# Patient Record
Sex: MALE | Race: BLACK OR AFRICAN AMERICAN | NOT HISPANIC OR LATINO | Employment: UNEMPLOYED | RURAL
[De-identification: names, ages, dates, MRNs, and addresses within clinical notes are randomized per-mention and may not be internally consistent; named-entity substitution may affect disease eponyms.]

---

## 2023-01-31 ENCOUNTER — HOSPITAL ENCOUNTER (INPATIENT)
Facility: HOSPITAL | Age: 88
LOS: 7 days | Discharge: HOME HEALTH | DRG: 503 | End: 2023-02-07
Attending: INTERNAL MEDICINE | Admitting: INTERNAL MEDICINE
Payer: MEDICARE

## 2023-01-31 DIAGNOSIS — R64 CACHEXIA: ICD-10-CM

## 2023-01-31 DIAGNOSIS — R62.7 FAILURE TO THRIVE IN ADULT: ICD-10-CM

## 2023-01-31 DIAGNOSIS — R53.1 WEAKNESS: ICD-10-CM

## 2023-01-31 DIAGNOSIS — R07.9 CHEST PAIN: ICD-10-CM

## 2023-01-31 DIAGNOSIS — S91.309A OPEN WOUND OF FOOT: ICD-10-CM

## 2023-01-31 DIAGNOSIS — M86.9 OSTEOMYELITIS OF LEFT FOOT: ICD-10-CM

## 2023-01-31 DIAGNOSIS — E78.5 HYPERLIPIDEMIA, UNSPECIFIED HYPERLIPIDEMIA TYPE: ICD-10-CM

## 2023-01-31 DIAGNOSIS — G20.A1 DEMENTIA ASSOCIATED WITH PARKINSON'S DISEASE: ICD-10-CM

## 2023-01-31 DIAGNOSIS — M86.9 OSTEOMYELITIS OF LEFT FOOT, UNSPECIFIED TYPE: Primary | ICD-10-CM

## 2023-01-31 DIAGNOSIS — D63.8 ANEMIA OF CHRONIC DISEASE: ICD-10-CM

## 2023-01-31 DIAGNOSIS — F02.80 DEMENTIA ASSOCIATED WITH PARKINSON'S DISEASE: ICD-10-CM

## 2023-01-31 LAB
ALBUMIN SERPL BCP-MCNC: 2.8 G/DL (ref 3.5–5)
ALBUMIN/GLOB SERPL: 0.5 {RATIO}
ALP SERPL-CCNC: 91 U/L (ref 45–115)
ALT SERPL W P-5'-P-CCNC: 35 U/L (ref 16–61)
ANION GAP SERPL CALCULATED.3IONS-SCNC: 12 MMOL/L (ref 7–16)
APTT PPP: 26.8 SECONDS (ref 25.2–37.3)
AST SERPL W P-5'-P-CCNC: 38 U/L (ref 15–37)
BASOPHILS # BLD AUTO: 0.01 K/UL (ref 0–0.2)
BASOPHILS NFR BLD AUTO: 0.3 % (ref 0–1)
BILIRUB SERPL-MCNC: 0.5 MG/DL (ref ?–1.2)
BUN SERPL-MCNC: 33 MG/DL (ref 7–18)
BUN/CREAT SERPL: 37 (ref 6–20)
CALCIUM SERPL-MCNC: 9 MG/DL (ref 8.5–10.1)
CHLORIDE SERPL-SCNC: 116 MMOL/L (ref 98–107)
CO2 SERPL-SCNC: 30 MMOL/L (ref 21–32)
CREAT SERPL-MCNC: 0.89 MG/DL (ref 0.7–1.3)
DIFFERENTIAL METHOD BLD: ABNORMAL
EGFR (NO RACE VARIABLE) (RUSH/TITUS): 82 ML/MIN/1.73M²
EOSINOPHIL # BLD AUTO: 0.02 K/UL (ref 0–0.5)
EOSINOPHIL NFR BLD AUTO: 0.5 % (ref 1–4)
ERYTHROCYTE [DISTWIDTH] IN BLOOD BY AUTOMATED COUNT: 16.2 % (ref 11.5–14.5)
FLUAV AG UPPER RESP QL IA.RAPID: NEGATIVE
FLUBV AG UPPER RESP QL IA.RAPID: NEGATIVE
GLOBULIN SER-MCNC: 5.9 G/DL (ref 2–4)
GLUCOSE SERPL-MCNC: 88 MG/DL (ref 74–106)
HCT VFR BLD AUTO: 34.8 % (ref 40–54)
HGB BLD-MCNC: 10.2 G/DL (ref 13.5–18)
HOLD SPECIMEN: NORMAL
IMM GRANULOCYTES # BLD AUTO: 0.05 K/UL (ref 0–0.04)
IMM GRANULOCYTES NFR BLD: 1.3 % (ref 0–0.4)
INR BLD: 1.19
LYMPHOCYTES # BLD AUTO: 0.96 K/UL (ref 1–4.8)
LYMPHOCYTES NFR BLD AUTO: 24.2 % (ref 27–41)
MAGNESIUM SERPL-MCNC: 2.6 MG/DL (ref 1.7–2.3)
MCH RBC QN AUTO: 26.8 PG (ref 27–31)
MCHC RBC AUTO-ENTMCNC: 29.3 G/DL (ref 32–36)
MCV RBC AUTO: 91.6 FL (ref 80–96)
MONOCYTES # BLD AUTO: 0.35 K/UL (ref 0–0.8)
MONOCYTES NFR BLD AUTO: 8.8 % (ref 2–6)
MPC BLD CALC-MCNC: 10.4 FL (ref 9.4–12.4)
NEUTROPHILS # BLD AUTO: 2.57 K/UL (ref 1.8–7.7)
NEUTROPHILS NFR BLD AUTO: 64.9 % (ref 53–65)
NRBC # BLD AUTO: 0 X10E3/UL
NRBC, AUTO (.00): 0 %
NT-PROBNP SERPL-MCNC: 209 PG/ML (ref 1–450)
PLATELET # BLD AUTO: 189 K/UL (ref 150–400)
POTASSIUM SERPL-SCNC: 3.9 MMOL/L (ref 3.5–5.1)
PROT SERPL-MCNC: 8.7 G/DL (ref 6.4–8.2)
PROTHROMBIN TIME: 14.6 SECONDS (ref 11.7–14.7)
RBC # BLD AUTO: 3.8 M/UL (ref 4.6–6.2)
SARS-COV+SARS-COV-2 AG RESP QL IA.RAPID: NEGATIVE
SODIUM SERPL-SCNC: 154 MMOL/L (ref 136–145)
TROPONIN I SERPL HS-MCNC: 18.2 PG/ML
WBC # BLD AUTO: 3.96 K/UL (ref 4.5–11)

## 2023-01-31 PROCEDURE — 83880 ASSAY OF NATRIURETIC PEPTIDE: CPT | Performed by: NURSE PRACTITIONER

## 2023-01-31 PROCEDURE — 85025 COMPLETE CBC W/AUTO DIFF WBC: CPT | Performed by: NURSE PRACTITIONER

## 2023-01-31 PROCEDURE — 63600175 PHARM REV CODE 636 W HCPCS: Performed by: NURSE PRACTITIONER

## 2023-01-31 PROCEDURE — 80053 COMPREHEN METABOLIC PANEL: CPT | Performed by: NURSE PRACTITIONER

## 2023-01-31 PROCEDURE — 93010 EKG 12-LEAD: ICD-10-PCS | Mod: ,,, | Performed by: INTERNAL MEDICINE

## 2023-01-31 PROCEDURE — 93005 ELECTROCARDIOGRAM TRACING: CPT

## 2023-01-31 PROCEDURE — 99285 EMERGENCY DEPT VISIT HI MDM: CPT | Mod: 25

## 2023-01-31 PROCEDURE — 93010 ELECTROCARDIOGRAM REPORT: CPT | Mod: ,,, | Performed by: INTERNAL MEDICINE

## 2023-01-31 PROCEDURE — 85730 THROMBOPLASTIN TIME PARTIAL: CPT | Performed by: NURSE PRACTITIONER

## 2023-01-31 PROCEDURE — 99285 PR EMERGENCY DEPT VISIT,LEVEL V: ICD-10-PCS | Mod: ,,, | Performed by: NURSE PRACTITIONER

## 2023-01-31 PROCEDURE — 99285 EMERGENCY DEPT VISIT HI MDM: CPT | Mod: ,,, | Performed by: NURSE PRACTITIONER

## 2023-01-31 PROCEDURE — 25000003 PHARM REV CODE 250: Performed by: NURSE PRACTITIONER

## 2023-01-31 PROCEDURE — 85610 PROTHROMBIN TIME: CPT | Performed by: NURSE PRACTITIONER

## 2023-01-31 PROCEDURE — 84484 ASSAY OF TROPONIN QUANT: CPT | Performed by: NURSE PRACTITIONER

## 2023-01-31 PROCEDURE — 87070 CULTURE OTHR SPECIMN AEROBIC: CPT

## 2023-01-31 PROCEDURE — 83735 ASSAY OF MAGNESIUM: CPT | Performed by: NURSE PRACTITIONER

## 2023-01-31 PROCEDURE — 11000001 HC ACUTE MED/SURG PRIVATE ROOM

## 2023-01-31 PROCEDURE — 87428 SARSCOV & INF VIR A&B AG IA: CPT | Performed by: NURSE PRACTITIONER

## 2023-01-31 PROCEDURE — 87040 BLOOD CULTURE FOR BACTERIA: CPT

## 2023-01-31 RX ORDER — MEMANTINE HYDROCHLORIDE 10 MG/1
10 TABLET ORAL DAILY
Status: DISCONTINUED | OUTPATIENT
Start: 2023-02-01 | End: 2023-02-05

## 2023-01-31 RX ORDER — CARBIDOPA AND LEVODOPA 25; 100 MG/1; MG/1
1 TABLET ORAL DAILY
Status: DISCONTINUED | OUTPATIENT
Start: 2023-02-01 | End: 2023-02-05

## 2023-01-31 RX ORDER — DEXTROSE MONOHYDRATE AND SODIUM CHLORIDE 5; .45 G/100ML; G/100ML
INJECTION, SOLUTION INTRAVENOUS CONTINUOUS
Status: DISCONTINUED | OUTPATIENT
Start: 2023-02-01 | End: 2023-02-07 | Stop reason: HOSPADM

## 2023-01-31 RX ORDER — GLUCAGON 1 MG
1 KIT INJECTION
Status: DISCONTINUED | OUTPATIENT
Start: 2023-02-01 | End: 2023-02-07 | Stop reason: HOSPADM

## 2023-01-31 RX ORDER — ACETAMINOPHEN 325 MG/1
650 TABLET ORAL EVERY 8 HOURS PRN
Status: DISCONTINUED | OUTPATIENT
Start: 2023-02-01 | End: 2023-02-07 | Stop reason: HOSPADM

## 2023-01-31 RX ORDER — IBUPROFEN 200 MG
24 TABLET ORAL
Status: DISCONTINUED | OUTPATIENT
Start: 2023-02-01 | End: 2023-02-07 | Stop reason: HOSPADM

## 2023-01-31 RX ORDER — NALOXONE HCL 0.4 MG/ML
0.02 VIAL (ML) INJECTION
Status: DISCONTINUED | OUTPATIENT
Start: 2023-02-01 | End: 2023-02-07 | Stop reason: HOSPADM

## 2023-01-31 RX ORDER — ACETAMINOPHEN 325 MG/1
650 TABLET ORAL EVERY 4 HOURS PRN
Status: DISCONTINUED | OUTPATIENT
Start: 2023-02-01 | End: 2023-02-07 | Stop reason: HOSPADM

## 2023-01-31 RX ORDER — ATORVASTATIN CALCIUM 40 MG/1
40 TABLET, FILM COATED ORAL DAILY
Status: DISCONTINUED | OUTPATIENT
Start: 2023-02-01 | End: 2023-02-05

## 2023-01-31 RX ORDER — DONEPEZIL HYDROCHLORIDE 5 MG/1
10 TABLET, FILM COATED ORAL DAILY
Status: DISCONTINUED | OUTPATIENT
Start: 2023-02-01 | End: 2023-02-05

## 2023-01-31 RX ORDER — SODIUM CHLORIDE 0.9 % (FLUSH) 0.9 %
10 SYRINGE (ML) INJECTION EVERY 12 HOURS PRN
Status: DISCONTINUED | OUTPATIENT
Start: 2023-02-01 | End: 2023-02-07 | Stop reason: HOSPADM

## 2023-01-31 RX ORDER — ONDANSETRON 2 MG/ML
4 INJECTION INTRAMUSCULAR; INTRAVENOUS EVERY 8 HOURS PRN
Status: DISCONTINUED | OUTPATIENT
Start: 2023-02-01 | End: 2023-02-07 | Stop reason: HOSPADM

## 2023-01-31 RX ORDER — IBUPROFEN 200 MG
16 TABLET ORAL
Status: DISCONTINUED | OUTPATIENT
Start: 2023-02-01 | End: 2023-02-07 | Stop reason: HOSPADM

## 2023-01-31 RX ADMIN — SODIUM CHLORIDE 1000 ML: 9 INJECTION, SOLUTION INTRAVENOUS at 09:01

## 2023-01-31 RX ADMIN — PIPERACILLIN AND TAZOBACTAM 4.5 G: 4; .5 INJECTION, POWDER, FOR SOLUTION INTRAVENOUS; PARENTERAL at 10:01

## 2023-02-01 PROBLEM — D63.8 ANEMIA OF CHRONIC DISEASE: Status: ACTIVE | Noted: 2023-02-01

## 2023-02-01 PROBLEM — E78.5 HLD (HYPERLIPIDEMIA): Status: ACTIVE | Noted: 2023-02-01

## 2023-02-01 PROBLEM — R64 CACHEXIA: Status: ACTIVE | Noted: 2023-02-01

## 2023-02-01 PROBLEM — R62.7 FAILURE TO THRIVE IN ADULT: Status: ACTIVE | Noted: 2023-02-01

## 2023-02-01 PROBLEM — F02.80 DEMENTIA ASSOCIATED WITH PARKINSON'S DISEASE: Status: ACTIVE | Noted: 2023-02-01

## 2023-02-01 PROBLEM — R63.0 ANOREXIA: Status: ACTIVE | Noted: 2023-02-01

## 2023-02-01 PROBLEM — G20.A1 DEMENTIA ASSOCIATED WITH PARKINSON'S DISEASE: Status: ACTIVE | Noted: 2023-02-01

## 2023-02-01 LAB
ANION GAP SERPL CALCULATED.3IONS-SCNC: 10 MMOL/L (ref 7–16)
BACTERIA #/AREA URNS HPF: ABNORMAL /HPF
BASOPHILS # BLD AUTO: 0.01 K/UL (ref 0–0.2)
BASOPHILS NFR BLD AUTO: 0.3 % (ref 0–1)
BILIRUB UR QL STRIP: NEGATIVE
BUN SERPL-MCNC: 26 MG/DL (ref 7–18)
BUN/CREAT SERPL: 29 (ref 6–20)
CALCIUM SERPL-MCNC: 8.2 MG/DL (ref 8.5–10.1)
CHLORIDE SERPL-SCNC: 117 MMOL/L (ref 98–107)
CLARITY UR: CLEAR
CO2 SERPL-SCNC: 30 MMOL/L (ref 21–32)
COLOR UR: ABNORMAL
CREAT SERPL-MCNC: 0.89 MG/DL (ref 0.7–1.3)
CRP SERPL-MCNC: 0.8 MG/DL (ref 0–0.8)
DIFFERENTIAL METHOD BLD: ABNORMAL
EGFR (NO RACE VARIABLE) (RUSH/TITUS): 82 ML/MIN/1.73M²
EOSINOPHIL # BLD AUTO: 0.03 K/UL (ref 0–0.5)
EOSINOPHIL NFR BLD AUTO: 1 % (ref 1–4)
ERYTHROCYTE [DISTWIDTH] IN BLOOD BY AUTOMATED COUNT: 16.1 % (ref 11.5–14.5)
ERYTHROCYTE [SEDIMENTATION RATE] IN BLOOD BY WESTERGREN METHOD: 72 MM/HR (ref 0–30)
FERRITIN SERPL-MCNC: 173 NG/ML (ref 26–388)
FOLATE SERPL-MCNC: 19.3 NG/ML (ref 3.1–17.5)
GLUCOSE SERPL-MCNC: 90 MG/DL (ref 74–106)
GLUCOSE UR STRIP-MCNC: NORMAL MG/DL
HCT VFR BLD AUTO: 31.6 % (ref 40–54)
HGB BLD-MCNC: 9.5 G/DL (ref 13.5–18)
IMM GRANULOCYTES # BLD AUTO: 0.03 K/UL (ref 0–0.04)
IMM GRANULOCYTES NFR BLD: 1 % (ref 0–0.4)
IRON SATN MFR SERPL: 20 % (ref 14–50)
IRON SERPL-MCNC: 37 ΜG/DL (ref 65–175)
KETONES UR STRIP-SCNC: NEGATIVE MG/DL
LACTATE SERPL-SCNC: 0.8 MMOL/L (ref 0.4–2)
LEUKOCYTE ESTERASE UR QL STRIP: ABNORMAL
LYMPHOCYTES # BLD AUTO: 0.86 K/UL (ref 1–4.8)
LYMPHOCYTES NFR BLD AUTO: 28 % (ref 27–41)
MCH RBC QN AUTO: 27.4 PG (ref 27–31)
MCHC RBC AUTO-ENTMCNC: 30.1 G/DL (ref 32–36)
MCV RBC AUTO: 91.1 FL (ref 80–96)
MONOCYTES # BLD AUTO: 0.27 K/UL (ref 0–0.8)
MONOCYTES NFR BLD AUTO: 8.8 % (ref 2–6)
MPC BLD CALC-MCNC: 10.7 FL (ref 9.4–12.4)
MUCOUS THREADS #/AREA URNS HPF: ABNORMAL /HPF
NEUTROPHILS # BLD AUTO: 1.87 K/UL (ref 1.8–7.7)
NEUTROPHILS NFR BLD AUTO: 60.9 % (ref 53–65)
NITRITE UR QL STRIP: NEGATIVE
NRBC # BLD AUTO: 0 X10E3/UL
NRBC, AUTO (.00): 0 %
PH UR STRIP: 6.5 PH UNITS
PHOSPHATE SERPL-MCNC: 3.1 MG/DL (ref 2.5–4.5)
PLATELET # BLD AUTO: 159 K/UL (ref 150–400)
POTASSIUM SERPL-SCNC: 3.4 MMOL/L (ref 3.5–5.1)
PROT UR QL STRIP: NEGATIVE
RBC # BLD AUTO: 3.47 M/UL (ref 4.6–6.2)
RBC # UR STRIP: NEGATIVE /UL
RBC #/AREA URNS HPF: ABNORMAL /HPF
SODIUM SERPL-SCNC: 154 MMOL/L (ref 136–145)
SP GR UR STRIP: 1.03
SQUAMOUS #/AREA URNS LPF: ABNORMAL /LPF
TIBC SERPL-MCNC: 185 ΜG/DL (ref 250–450)
TRICHOMONAS #/AREA URNS HPF: ABNORMAL /HPF
UROBILINOGEN UR STRIP-ACNC: NORMAL MG/DL
VIT B12 SERPL-MCNC: 1906 PG/ML (ref 193–986)
WBC # BLD AUTO: 3.07 K/UL (ref 4.5–11)
WBC #/AREA URNS HPF: ABNORMAL /HPF
YEAST #/AREA URNS HPF: ABNORMAL /HPF

## 2023-02-01 PROCEDURE — 81001 URINALYSIS AUTO W/SCOPE: CPT | Performed by: NURSE PRACTITIONER

## 2023-02-01 PROCEDURE — 83550 IRON BINDING TEST: CPT

## 2023-02-01 PROCEDURE — 99223 1ST HOSP IP/OBS HIGH 75: CPT | Mod: AI,,, | Performed by: INTERNAL MEDICINE

## 2023-02-01 PROCEDURE — 83540 ASSAY OF IRON: CPT

## 2023-02-01 PROCEDURE — 82728 ASSAY OF FERRITIN: CPT

## 2023-02-01 PROCEDURE — 11000001 HC ACUTE MED/SURG PRIVATE ROOM

## 2023-02-01 PROCEDURE — 25000003 PHARM REV CODE 250

## 2023-02-01 PROCEDURE — 63600175 PHARM REV CODE 636 W HCPCS: Performed by: HOSPITALIST

## 2023-02-01 PROCEDURE — S5010 5% DEXTROSE AND 0.45% SALINE: HCPCS

## 2023-02-01 PROCEDURE — 83605 ASSAY OF LACTIC ACID: CPT

## 2023-02-01 PROCEDURE — 99222 PR INITIAL HOSPITAL CARE,LEVL II: ICD-10-PCS | Mod: GW,,, | Performed by: SURGERY

## 2023-02-01 PROCEDURE — 25000003 PHARM REV CODE 250: Performed by: HOSPITALIST

## 2023-02-01 PROCEDURE — 99222 1ST HOSP IP/OBS MODERATE 55: CPT | Mod: GW,,, | Performed by: SURGERY

## 2023-02-01 PROCEDURE — 86140 C-REACTIVE PROTEIN: CPT

## 2023-02-01 PROCEDURE — S5010 5% DEXTROSE AND 0.45% SALINE: HCPCS | Performed by: HOSPITALIST

## 2023-02-01 PROCEDURE — 85651 RBC SED RATE NONAUTOMATED: CPT

## 2023-02-01 PROCEDURE — 84100 ASSAY OF PHOSPHORUS: CPT

## 2023-02-01 PROCEDURE — 80048 BASIC METABOLIC PNL TOTAL CA: CPT

## 2023-02-01 PROCEDURE — 85025 COMPLETE CBC W/AUTO DIFF WBC: CPT

## 2023-02-01 PROCEDURE — 63600175 PHARM REV CODE 636 W HCPCS

## 2023-02-01 PROCEDURE — 99223 PR INITIAL HOSPITAL CARE,LEVL III: ICD-10-PCS | Mod: AI,,, | Performed by: INTERNAL MEDICINE

## 2023-02-01 PROCEDURE — 82746 ASSAY OF FOLIC ACID SERUM: CPT

## 2023-02-01 RX ORDER — POLYETHYLENE GLYCOL 3350 17 G/17G
17 POWDER, FOR SOLUTION ORAL DAILY
Status: DISCONTINUED | OUTPATIENT
Start: 2023-02-01 | End: 2023-02-05

## 2023-02-01 RX ADMIN — DEXTROSE AND SODIUM CHLORIDE: 5; 450 INJECTION, SOLUTION INTRAVENOUS at 11:02

## 2023-02-01 RX ADMIN — THIAMINE HYDROCHLORIDE 500 MG: 100 INJECTION, SOLUTION INTRAMUSCULAR; INTRAVENOUS at 01:02

## 2023-02-01 RX ADMIN — PIPERACILLIN AND TAZOBACTAM 4.5 G: 4; .5 INJECTION, POWDER, FOR SOLUTION INTRAVENOUS; PARENTERAL at 10:02

## 2023-02-01 RX ADMIN — PIPERACILLIN AND TAZOBACTAM 4.5 G: 4; .5 INJECTION, POWDER, FOR SOLUTION INTRAVENOUS; PARENTERAL at 06:02

## 2023-02-01 RX ADMIN — DEXTROSE AND SODIUM CHLORIDE: 5; 450 INJECTION, SOLUTION INTRAVENOUS at 12:02

## 2023-02-01 RX ADMIN — VANCOMYCIN HYDROCHLORIDE 1000 MG: 1 INJECTION, POWDER, LYOPHILIZED, FOR SOLUTION INTRAVENOUS at 02:02

## 2023-02-01 RX ADMIN — PIPERACILLIN AND TAZOBACTAM 4.5 G: 4; .5 INJECTION, POWDER, FOR SOLUTION INTRAVENOUS; PARENTERAL at 02:02

## 2023-02-01 NOTE — HPI
89-year-old male patient admitted to the hospital last night due to failure to thrive.  He has had a progressive decline in his mental status and only eat small amount of pureed food every of the day, according to his daughter.  The primary service has talked to the family extensively about hospice and comfort care, but they are not ready to commit him to this.  A feeding tube is requested.    In addition, the patient was noted to have some foot wounds and a left foot x-ray was performed revealing erosion at the base of the 5th metatarsal head, laterally.  Surgery was asked to evaluate this, as well.

## 2023-02-01 NOTE — ED TRIAGE NOTES
Presents to ED for complaints of not eating or drinking for 2 days, wound to left heel, sore throat and constipation.

## 2023-02-01 NOTE — CONSULTS
[facility]  General Surgery  Consult Note    Patient Name: Juliet Arboleda  MRN: [id number]  Code Status: Full Code  Admission Date: 1/31/2023  Hospital Length of Stay: 1 days  Attending Physician: Jerrod Almanza MD  Primary Care Provider: Primary Doctor No    Patient information was obtained from relative(s), ER records and primary team.     Consults  Subjective:     Principal Problem: Osteomyelitis of left foot    History of Present Illness: 89-year-old male patient admitted to the hospital last night due to failure to thrive.  He has had a progressive decline in his mental status and only eat small amount of pureed food every of the day, according to his daughter.  The primary service has talked to the family extensively about hospice and comfort care, but they are not ready to commit him to this.  A feeding tube is requested.    In addition, the patient was noted to have some foot wounds and a left foot x-ray was performed revealing erosion at the base of the 5th metatarsal head, laterally.  Surgery was asked to evaluate this, as well.      No current facility-administered medications on file prior to encounter.     No current outpatient medications on file prior to encounter.       Review of patient's allergies indicates:   Allergen Reactions    Bactrim [sulfamethoxazole-trimethoprim]        Past Medical History:   Diagnosis Date    Parkinson's disease      Past Surgical History:   Procedure Laterality Date    lung nodule removal N/A 1990     Family History       Problem Relation (Age of Onset)    No Known Problems Mother, Father          Tobacco Use    Smoking status: Former     Types: Cigarettes    Smokeless tobacco: Never   Substance and Sexual Activity    Alcohol use: Not Currently     Comment: Socially    Drug use: Not on file    Sexual activity: Not Currently     Review of Systems   Reason unable to perform ROS: Noncommunicative.   Constitutional:  Positive for appetite change.    Objective:     Vital Signs (Most Recent):  Temp: 97.9 °F (36.6 °C) (02/01/23 1200)  Pulse: (!) 56 (02/01/23 1200)  Resp: 18 (02/01/23 1200)  BP: (!) 110/37 (02/01/23 1200)  SpO2: 100 % (02/01/23 1200)   Vital Signs (24h Range):  Temp:  [97.5 °F (36.4 °C)-97.9 °F (36.6 °C)] 97.9 °F (36.6 °C)  Pulse:  [54-68] 56  Resp:  [18-20] 18  SpO2:  [100 %] 100 %  BP: ()/(37-96) 110/37     Weight: 52.2 kg (115 lb)  Body mass index is 17.49 kg/m².    Physical Exam  Constitutional:       Comments: Poorly responsive   Cardiovascular:      Rate and Rhythm: Normal rate.   Pulmonary:      Breath sounds: Normal breath sounds.   Abdominal:      Palpations: Abdomen is soft.   Musculoskeletal:         General: No swelling.   Skin:     Coloration: Skin is not jaundiced.      Comments: Lateral left foot at the midfoot level with ulceration that does not appear to have exposed bone.   Neurological:      General: No focal deficit present.   Psychiatric:         Mood and Affect: Mood normal.       Significant Labs:  I have reviewed all pertinent lab results within the past 24 hours.  CBC:   Recent Labs   Lab 02/01/23  0510   WBC 3.07*   RBC 3.47*   HGB 9.5*   HCT 31.6*      MCV 91.1   MCH 27.4   MCHC 30.1*     BMP:   Recent Labs   Lab 01/31/23  1857 02/01/23  0510   GLU 88 90   * 154*   K 3.9 3.4*   * 117*   CO2 30 30   BUN 33* 26*   CREATININE 0.89 0.89   CALCIUM 9.0 8.2*   MG 2.6*  --        Significant Diagnostics:  I have reviewed all pertinent imaging results/findings within the past 24 hours.      Assessment/Plan:     * Osteomyelitis of left foot  Evaluate in the OR to see if bone is exposed, with debridement if indicated    Cachexia  Related to poor appetite/pocketing/swallowing dysfunction secondary to mental status decline    After discussion with Dr. Almanza who attempted to obtain hospice and comfort measure status from a family, and after discussion with the family, it is decided to proceed with placement  of percutaneous gastrostomy tube, with opened tube if indicated.  Patient's daughter expresses understanding to risks and benefits discussed including infection, bleeding, malpositioning, and potential problems with anesthesia with a heart or lungs.  She expresses understanding wishes to proceed.      VTE Risk Mitigation (From admission, onward)         Ordered     IP VTE HIGH RISK PATIENT  Once         01/31/23 2319     Place sequential compression device  Until discontinued         01/31/23 2319     Reason for No Pharmacological VTE Prophylaxis  Once        Question:  Reasons:  Answer:  Physician Provided (leave comment)  Comment:  possible surgery tomorrow    01/31/23 2319                Thank you for your consult. I will follow-up with patient. Please contact us if you have any additional questions.    David Lutz MD  General Surgery  [facility]

## 2023-02-01 NOTE — SUBJECTIVE & OBJECTIVE
No current facility-administered medications on file prior to encounter.     No current outpatient medications on file prior to encounter.       Review of patient's allergies indicates:   Allergen Reactions    Bactrim [sulfamethoxazole-trimethoprim]        Past Medical History:   Diagnosis Date    Parkinson's disease      Past Surgical History:   Procedure Laterality Date    lung nodule removal N/A 1990     Family History       Problem Relation (Age of Onset)    No Known Problems Mother, Father          Tobacco Use    Smoking status: Former     Types: Cigarettes    Smokeless tobacco: Never   Substance and Sexual Activity    Alcohol use: Not Currently     Comment: Socially    Drug use: Not on file    Sexual activity: Not Currently     Review of Systems   Reason unable to perform ROS: Noncommunicative.   Constitutional:  Positive for appetite change.   Objective:     Vital Signs (Most Recent):  Temp: 97.9 °F (36.6 °C) (02/01/23 1200)  Pulse: (!) 56 (02/01/23 1200)  Resp: 18 (02/01/23 1200)  BP: (!) 110/37 (02/01/23 1200)  SpO2: 100 % (02/01/23 1200)   Vital Signs (24h Range):  Temp:  [97.5 °F (36.4 °C)-97.9 °F (36.6 °C)] 97.9 °F (36.6 °C)  Pulse:  [54-68] 56  Resp:  [18-20] 18  SpO2:  [100 %] 100 %  BP: ()/(37-96) 110/37     Weight: 52.2 kg (115 lb)  Body mass index is 17.49 kg/m².    Physical Exam  Constitutional:       Comments: Poorly responsive   Cardiovascular:      Rate and Rhythm: Normal rate.   Pulmonary:      Breath sounds: Normal breath sounds.   Abdominal:      Palpations: Abdomen is soft.   Musculoskeletal:         General: No swelling.   Skin:     Coloration: Skin is not jaundiced.      Comments: Lateral left foot at the midfoot level with ulceration that does not appear to have exposed bone.   Neurological:      General: No focal deficit present.   Psychiatric:         Mood and Affect: Mood normal.       Significant Labs:  I have reviewed all pertinent lab results within the past 24 hours.  CBC:    Recent Labs   Lab 02/01/23  0510   WBC 3.07*   RBC 3.47*   HGB 9.5*   HCT 31.6*      MCV 91.1   MCH 27.4   MCHC 30.1*     BMP:   Recent Labs   Lab 01/31/23  1857 02/01/23  0510   GLU 88 90   * 154*   K 3.9 3.4*   * 117*   CO2 30 30   BUN 33* 26*   CREATININE 0.89 0.89   CALCIUM 9.0 8.2*   MG 2.6*  --        Significant Diagnostics:  I have reviewed all pertinent imaging results/findings within the past 24 hours.

## 2023-02-01 NOTE — PLAN OF CARE
Problem: Adult Inpatient Plan of Care  Goal: Plan of Care Review  Outcome: Ongoing, Progressing  Flowsheets (Taken 2/1/2023 0113)  Plan of Care Reviewed With: patient  Goal: Patient-Specific Goal (Individualized)  Outcome: Ongoing, Progressing  Flowsheets (Taken 2/1/2023 0113)  Anxieties, Fears or Concerns: non voiced  Individualized Care Needs:   wound care   infection control  Goal: Absence of Hospital-Acquired Illness or Injury  Outcome: Ongoing, Progressing  Intervention: Identify and Manage Fall Risk  Flowsheets (Taken 2/1/2023 0113)  Safety Promotion/Fall Prevention:   assistive device/personal item within reach   family to remain at bedside   medications reviewed   side rails raised x 3   instructed to call staff for mobility  Intervention: Prevent Skin Injury  Flowsheets (Taken 2/1/2023 0113)  Body Position:   turned   right   weight shifting  Skin Protection:   adhesive use limited   incontinence pads utilized   silicone foam dressing in place  Intervention: Prevent and Manage VTE (Venous Thromboembolism) Risk  Flowsheets (Taken 2/1/2023 0113)  Activity Management: Patient unable to perform activities  VTE Prevention/Management:   fluids promoted   intravenous hydration   ROM (active) performed  Range of Motion: ROM (range of motion) performed  Intervention: Prevent Infection  Flowsheets (Taken 2/1/2023 0113)  Infection Prevention:   equipment surfaces disinfected   hand hygiene promoted   rest/sleep promoted   single patient room provided  Goal: Optimal Comfort and Wellbeing  Outcome: Ongoing, Progressing  Intervention: Monitor Pain and Promote Comfort  Flowsheets (Taken 2/1/2023 0113)  Pain Management Interventions:   care clustered   diversional activity provided   quiet environment facilitated   relaxation techniques promoted   position adjusted   pillow support provided  Intervention: Provide Person-Centered Care  Flowsheets (Taken 2/1/2023 0113)  Trust Relationship/Rapport:   choices provided   care  explained   questions answered   questions encouraged  Goal: Readiness for Transition of Care  Outcome: Ongoing, Progressing  Intervention: Mutually Develop Transition Plan  Flowsheets (Taken 2/1/2023 0113)  Transportation Anticipated: family or friend will provide  Communicated ROB with patient/caregiver: Date not available/Unable to determine  Do you expect to return to your current living situation?: Yes  Do you have help at home or someone to help you manage your care at home?: Yes     Problem: Impaired Wound Healing  Goal: Optimal Wound Healing  Outcome: Ongoing, Progressing  Intervention: Promote Wound Healing  Flowsheets (Taken 2/1/2023 0113)  Oral Nutrition Promotion: rest periods promoted  Sleep/Rest Enhancement:   regular sleep/rest pattern promoted   relaxation techniques promoted  Activity Management: Patient unable to perform activities  Pain Management Interventions:   care clustered   diversional activity provided   quiet environment facilitated   relaxation techniques promoted   position adjusted   pillow support provided     Problem: Skin Injury Risk Increased  Goal: Skin Health and Integrity  Outcome: Ongoing, Progressing  Intervention: Optimize Skin Protection  Flowsheets (Taken 2/1/2023 0113)  Pressure Reduction Techniques: weight shift assistance provided  Pressure Reduction Devices:   foam padding utilized   positioning supports utilized   specialty bed utilized  Skin Protection:   adhesive use limited   incontinence pads utilized   silicone foam dressing in place  Head of Bed (HOB) Positioning:   HOB at 20-30 degrees   HOB at 30-45 degrees  Intervention: Promote and Optimize Oral Intake  Flowsheets (Taken 2/1/2023 0113)  Oral Nutrition Promotion: rest periods promoted     Problem: Fall Injury Risk  Goal: Absence of Fall and Fall-Related Injury  Outcome: Ongoing, Progressing  Intervention: Identify and Manage Contributors  Flowsheets (Taken 2/1/2023 0113)  Medication Review/Management:  medications reviewed  Intervention: Promote Injury-Free Environment  Flowsheets (Taken 2/1/2023 0113)  Safety Promotion/Fall Prevention:   assistive device/personal item within reach   family to remain at bedside   medications reviewed   side rails raised x 3   instructed to call staff for mobility

## 2023-02-01 NOTE — ASSESSMENT & PLAN NOTE
Xray: Erosion at the base of the 5th metatarsal may reflect osteomyelitis.  However, no comparisons available to determine the chronicity of this finding.  Wound culture of the latera wound at the base of the 5th metatarsal is pending  BC pending  Vanc and zosyn  Fluid maintenance   Surgery consulted  Wound care consulted   NPO  ESR, CRP, Lactic acid, Chest xray pending and UA is pending

## 2023-02-01 NOTE — H&P
[facility]  Hospital Medicine  History & Physical    Patient Name: Juliet Arboleda  MRN: [id number]  Patient Class: IP- Inpatient  Admission Date: 1/31/2023  Attending Physician: Jerrod Almanza MD   Primary Care Provider: No primary care provider on file.         Patient information was obtained from relative(s), caregiver / friend and ER records.     Subjective:     Principal Problem:Osteomyelitis of left foot    Chief Complaint:   Chief Complaint   Patient presents with    Wound Infection    Weakness        HPI: 89 year old male was brought to the ED at [facility] due to weakness and foul smelling wound of the right foot. Patient is non verbal, has dementia and is bed bound therefor the Hx is provided by the daughter who is the care giver.  Patient started having mucus built up and spitting up at home 4-5 days ago, requiring suction. He started having weakness, decrease appetite during this time and skipping meals until for the past 2 days he stopped eating completely therefore the daughter decided to bring him into the ED. Daughter denies any vomiting, tenderness in the abdomen, diarrhea, abnormalities with the urine but reports patient is not at his baseline and is less responsive and more confuse than usual.    Patient has 4 wounds, one sacral wound and one on the left hip/left buttock that are not draining and do not look infectious. He also has 2 wounds on the left foot. One wound is on the dorsal surface of the foot and is not draining but the wound at the base of the 5th metatarsal/lateral aspect of the foot is draining purulent fluid, is tender to touch and is foul smelling. Daughter reports patient receives wound care at [facility] and has received IV antibiotics in the past because of the foot wounds.      Patient was at a normal level of functioning until 2016 when he was diagnosed with Parkinson and then started having dementia gradually. Patient was able to ambulate  until 2018 when he had a huge decline and a fall then he became bed bound. He currently is non verbal at home. Patient has chronic constipation and uses enema every 6 days. 2 days ago was the last time enema was used and BM was very small in volume. Daughter reports they have been considering a PEG tube for the patient and have been in touch with a surgeon in [place] regarding PEG.    ED course:   Xray of the left foot: Erosion at the base of the 5th metatarsal may reflect osteomyelitis.  However, no comparisons available to determine the chronicity of this finding.  Sodium was 154 with Cl 116.   Cr GFR and liver enzymes wnl  Troponin, BNP normal  WBC is not elevated but H/H os 10.2/34.8        Past Medical History:   Diagnosis Date    Parkinson's disease        Past Surgical History:   Procedure Laterality Date    lung nodule removal N/A 1990       Review of patient's allergies indicates:   Allergen Reactions    Bactrim [sulfamethoxazole-trimethoprim]        No current facility-administered medications on file prior to encounter.     No current outpatient medications on file prior to encounter.     Family History       Problem Relation (Age of Onset)    No Known Problems Mother, Father          Tobacco Use    Smoking status: Former     Types: Cigarettes    Smokeless tobacco: Never   Substance and Sexual Activity    Alcohol use: Not Currently     Comment: Socially    Drug use: Not on file    Sexual activity: Not Currently     Review of Systems   Unable to perform ROS: Dementia   Objective:     Vital Signs (Most Recent):  Temp: 97.8 °F (36.6 °C) (01/31/23 1806)  Pulse: 62 (01/31/23 1806)  Resp: 20 (01/31/23 1806)  BP: (!) 115/96 (01/31/23 1806)  SpO2: 100 % (01/31/23 1806)   Vital Signs (24h Range):  Temp:  [97.8 °F (36.6 °C)] 97.8 °F (36.6 °C)  Pulse:  [62] 62  Resp:  [20] 20  SpO2:  [100 %] 100 %  BP: (115)/(96) 115/96     Weight: 52.2 kg (115 lb)  Body mass index is 17.49 kg/m².    Physical Exam  Vitals  and nursing note reviewed.   Constitutional:       General: He is not in acute distress.     Appearance: Normal appearance. He is ill-appearing. He is not toxic-appearing.   HENT:      Head: Normocephalic.      Right Ear: External ear normal.      Left Ear: External ear normal.      Nose: Nose normal. No congestion or rhinorrhea.      Mouth/Throat:      Mouth: Mucous membranes are dry.   Eyes:      Conjunctiva/sclera: Conjunctivae normal.   Cardiovascular:      Rate and Rhythm: Normal rate and regular rhythm.      Pulses: Normal pulses.      Heart sounds: Normal heart sounds. No murmur heard.    No friction rub.   Pulmonary:      Effort: Pulmonary effort is normal. No respiratory distress.      Breath sounds: Normal breath sounds. No wheezing or rales.   Abdominal:      General: Abdomen is flat. There is no distension.      Palpations: Abdomen is soft.      Tenderness: There is no abdominal tenderness. There is no guarding.   Musculoskeletal:         General: Tenderness present. No swelling.      Right lower leg: No edema.      Left lower leg: No edema.        Feet:    Skin:     General: Skin is warm.      Coloration: Skin is not jaundiced.      Findings: Wound present. No lesion.      Comments: Sacral wound and a wound on the left hip/buttock    Neurological:      Mental Status: He is alert and oriented to person, place, and time.   Psychiatric:         Behavior: Behavior normal.           Significant Labs: All pertinent labs within the past 24 hours have been reviewed.    Significant Imaging: I have reviewed all pertinent imaging results/findings within the past 24 hours.    Assessment/Plan:     * Osteomyelitis of left foot    Xray: Erosion at the base of the 5th metatarsal may reflect osteomyelitis.  However, no comparisons available to determine the chronicity of this finding.  Wound culture of the latera wound at the base of the 5th metatarsal is pending  BC pending  [facility] and zosyn  Fluid maintenance   Surgery  consulted  Wound care consulted   NPO  ESR, CRP, Lactic acid, Chest xray pending and UA is pending     Cachexia    BMI of 17.49  Patient is unable to feed himself and daughter is the caregiver   Patient can only have Puree food.   Family has considered PEG tube and had an appointment today at [place] to consult a surgeon regarding PEG- could not go to the appointment since they came to the ED  Patient has not ate anything in the last 2 days, has had decrease oral intake in the last 5 days  D5 half normal saline with rate of 75 cc/hr  refeeding syndrome precautions - high dose Thiamine IV ordered daily  Phosphate levels pending      Dementia associated with Parkinson's disease    Continue home meds carbidopa-levodopa, donepezol and memantine    Anemia of chronic disease    H/H 10.2/34.8 with MCV if 91.6  [facility]  Iron studies, B12 and folate are pending to r/o other causes of anemia    HLD (hyperlipidemia)    Home Crestor was changed to lipitor 40 mg daily       VTE Risk Mitigation (From admission, onward)         Ordered     IP VTE HIGH RISK PATIENT  Once         01/31/23 2319     Place sequential compression device  Until discontinued         01/31/23 2319     Reason for No Pharmacological VTE Prophylaxis  Once        Question:  Reasons:  Answer:  Physician Provided (leave comment)  Comment:  possible surgery tomorrow    01/31/23 2319                   Alirio Robles MD  Department of Hospital Medicine   [facility]

## 2023-02-01 NOTE — ED NOTES
Attempted to catheterize patient with coude catheter. Unable to obtain urine specimen. Provider aware

## 2023-02-01 NOTE — ED PROVIDER NOTES
Encounter Date: 1/31/2023       History     Chief Complaint   Patient presents with    Sore Throat    Wound Infection    Weakness     Patient is brought to the ER by his daughter.  His daughter is his caretaker.  Patient is bed-bound and total care patient.  Daughter states patient has not eaten or drank anything in 2 days.  She states he is not had a bowel movement in 6 days.  She states he has history of constipation and she gives him an enema every 6 days.  When she gave him his enema ileus smallest amount of stool returned.  Patient is nonverbal.  He will open his eyes to pain and occasionally to touch.  Daughter had appointment with specialist in a.m. for evaluation for PEG tube.  She states she canceled that appointment because she decided to bring him to the ER tonight.  She states the wound on his left foot started to have odor.  She denies nausea or vomiting.  She denies fever.  Daughter states most of patient's care is received in the [place] area.  She states patient has home in [facility] and [place].       The history is provided by the patient and a relative. No  was used.   Review of patient's allergies indicates:   Allergen Reactions    Bactrim [sulfamethoxazole-trimethoprim]      No past medical history on file.  No past surgical history on file.  No family history on file.     Review of Systems   Constitutional:  Positive for appetite change and fatigue.   Gastrointestinal:  Positive for constipation.   Genitourinary:  Positive for decreased urine volume.   All other systems reviewed and are negative.    Physical Exam     Initial Vitals [01/31/23 1806]   BP Pulse Resp Temp SpO2   (!) 115/96 62 20 97.8 °F (36.6 °C) 100 %      MAP       --         Physical Exam    Nursing note and vitals reviewed.  Constitutional: He appears listless.   HENT:   Head: Normocephalic.   Nose: Nose normal.   Eyes: Conjunctivae are normal.   Neck: Neck supple.   Normal range of  motion.  Cardiovascular:  Normal rate, regular rhythm, normal heart sounds and intact distal pulses.           Pulmonary/Chest: Breath sounds normal.   Abdominal: Abdomen is soft. Bowel sounds are normal.   Musculoskeletal:      Cervical back: Normal range of motion and neck supple.     Neurological: He appears listless. He displays atrophy. GCS eye subscore is 4. GCS verbal subscore is 1. GCS motor subscore is 4.   Patient is bed-bound and has multiple contractures of arms and legs.   Skin: Skin is warm and dry. Capillary refill takes less than 2 seconds. Lesion noted.        Open wound noted to the dorsal area of left foot and to the lateral aspect of left foot at the 5th metatarsal base.   Psychiatric: He is noncommunicative.   Patient is bed-bound and nonverbal. He is inattentive.       Medical Screening Exam   See Full Note    ED Course   Procedures  Labs Reviewed   COMPREHENSIVE METABOLIC PANEL - Abnormal; Notable for the following components:       Result Value    Sodium 154 (*)     Chloride 116 (*)     BUN 33 (*)     BUN/Creatinine Ratio 37 (*)     Total Protein 8.7 (*)     Albumin 2.8 (*)     Globulin 5.9 (*)     AST 38 (*)     All other components within normal limits   MAGNESIUM - Abnormal; Notable for the following components:    Magnesium 2.6 (*)     All other components within normal limits   CBC WITH DIFFERENTIAL - Abnormal; Notable for the following components:    WBC 3.96 (*)     RBC 3.80 (*)     Hemoglobin 10.2 (*)     Hematocrit 34.8 (*)     MCH 26.8 (*)     MCHC 29.3 (*)     RDW 16.2 (*)     Lymphocytes % 24.2 (*)     Monocytes % 8.8 (*)     Eosinophils % 0.5 (*)     Immature Granulocytes % 1.3 (*)     Lymphocytes, Absolute 0.96 (*)     Immature Granulocytes, Absolute 0.05 (*)     All other components within normal limits   TROPONIN I - Normal   NT-PRO NATRIURETIC PEPTIDE - Normal   APTT - Normal   PROTIME-INR - Normal   SARS-COV2 (COVID) W/ FLU ANTIGEN - Normal    Narrative:     Negative  SARS-CoV results should not be used as the sole basis for treatment or patient management decisions; negative results should be considered in the context of a patient's recent exposures, history and the presene of clinical signs and symptoms consistent with COVID-19.  Negative results should be treated as presumptive and confirmed by molecular assay, if necessary for patient management.   CBC W/ AUTO DIFFERENTIAL    Narrative:     The following orders were created for panel order CBC auto differential.  Procedure                               Abnormality         Status                     ---------                               -----------         ------                     CBC with Differential[[id number]]        Abnormal            Final result                 Please view results for these tests on the individual orders.   EXTRA TUBES    Narrative:     The following orders were created for panel order EXTRA TUBES.  Procedure                               Abnormality         Status                     ---------                               -----------         ------                     Dover Top Hold[[id number]]                                    Final result                 Please view results for these tests on the individual orders.   GREY TOP HOLD   URINALYSIS, REFLEX TO URINE CULTURE   EXTRA TUBES    Narrative:     The following orders were created for panel order EXTRA TUBES.  Procedure                               Abnormality         Status                     ---------                               -----------         ------                     Light Green Top Hold[[id number]]                             In process                   Please view results for these tests on the individual orders.   LIGHT GREEN TOP HOLD          Imaging Results              X-Ray Foot Complete Left (Final result)  Result time 01/31/23 20:54:46   Procedure changed from X-Ray Foot Complete Right     Final result by Leovn Jeff MD  (01/31/23 20:54:46)                   Impression:      Erosion at the base of the 5th metatarsal may reflect osteomyelitis.  However, no comparisons available to determine the chronicity of this finding.      Electronically signed by: Levon Jeff  Date:    01/31/2023  Time:    20:54               Narrative:    EXAMINATION:  XR FOOT COMPLETE 3 VIEW LEFT    CLINICAL HISTORY:  sore;.  Unspecified open wound, unspecified foot, initial encounter    TECHNIQUE:  AP, lateral and oblique views of the left foot were performed.    COMPARISON:  None    FINDINGS:  Diffuse osteopenia and degenerative changes are seen.  There is no acute fracture.  No dislocation.  There is some nonspecific erosion at the base of the 5th metatarsal.                                       X-Ray Abdomen Flat And Erect (Final result)  Result time 01/31/23 20:55:04   Procedure changed from XR ABDOMEN, ACUTE 2 OR MORE VIEWS WITH CHEST     Final result by Levon Jeff MD (01/31/23 20:55:04)                   Impression:      Mild to moderate colonic stool.      Electronically signed by: Levon Jeff  Date:    01/31/2023  Time:    20:55               Narrative:    EXAMINATION:  XR ABDOMEN FLAT AND ERECT    CLINICAL HISTORY:  constipation;    TECHNIQUE:  Flat and erect AP views of the abdomen were performed.    COMPARISON:  None    FINDINGS:  There is mild to moderate degree of colonic stool.  No bowel obstruction.  No pneumoperitoneum.                                       Medications   sodium chloride 0.9% bolus 1,000 mL 1,000 mL (1,000 mLs Intravenous New Bag 1/31/23 4162)   piperacillin-tazobactam (ZOSYN) 4.5 g in dextrose 5 % in water (D5W) 5 % 100 mL IVPB (MB+) (has no administration in time range)     Medical Decision Making:   Initial Assessment:   Patient is brought to the ER by his daughter.  His daughter is his caretaker.  Patient is bed-bound and total care patient.  Daughter states patient has not eaten or drank anything in 2 days.  She states he  is not had a bowel movement in 6 days.  She states he has history of constipation and she gives him an enema every 6 days.  When she gave him his enema ileus smallest amount of stool returned.  Patient is nonverbal.  He will open his eyes to pain and occasionally to touch.  Daughter had appointment with specialist in a.m. for evaluation for PEG tube.  She states she canceled that appointment [facility]  She states the wound on his left foot started to have odor.  She denies nausea or vomiting.  She denies fever.  Daughter states most of patient's care is received in the [place] area.  She states patient has home in [facility] and [place].   Differential Diagnosis:   Nonhealing wound left foot  COVID flu  Constipation  Bowel obstruction  Failure to thrive  Dysphagia    Clinical Tests:   Lab Tests: Ordered and Reviewed  Radiological Study: Ordered and Reviewed  Medical Tests: Ordered and Reviewed  ED Management:  EKG normal sinus rhythm with a rate of 61 beats per minute artifact noted on EKG reviewed with Dr. Vieyra at 7:39 p.m.   CBC white blood count 3.96, H/H 10.2/34.8, platelets 189  CMP sodium 154, creatinine 0.89, BUN 33, AST 38  Mag 2.6  Troponin 18.2    COVID flu is negative  Acute abdominal series: X-ray was unable to shoe chest x-ray really to contractures of arms across the chest.     KUB mild colonic stool.  X-ray of left foot shows erosion and possible osteomyelitis of the 5th metatarsal base    IV initiated 1 L normal saline ordered bolus  Zosyn ordered IV  Vancomycin IV ordered to be dosed by pharmacy    We will admit to hospitalist service diagnosis of osteomyelitis left foot and failure to thrive.  Other:   I have discussed this case with another health care provider.       <> Summary of the Discussion: Discussed x-ray of left foot with Dr. Lutz.  Dr. Lutz recommendation of IV antibiotics.  P.o. antibiotics if patient is discharged home.  Would  not recommend surgery at this point with the patient not eating.    Dr. Tam consulted for admission.  We will admit to hospitalist service of Dr. Almanza.                 Clinical Impression:   Final diagnoses:  [R53.1] Weakness  [S91.309A] Open wound of foot  [M86.9] Osteomyelitis of left foot, unspecified type (Primary)  [R62.7] Failure to thrive in adult        ED Disposition Condition    Admit Stable                SABINO Peres  01/31/23 8548

## 2023-02-01 NOTE — ASSESSMENT & PLAN NOTE
BMI of 17.49  Patient is unable to feed himself and daughter is the caregiver   Patient can only have Puree food.   Family has considered PEG tube and had an appointment today at [place] to consult a surgeon regarding PEG- could not go to the appointment since they came to the ED  Patient has not ate anything in the last 2 days, has had decrease oral intake in the last 5 days  D5 half normal saline with rate of 75 cc/hr  refeeding syndrome precautions - high dose Thiamine IV ordered daily  Phosphate levels pending

## 2023-02-01 NOTE — PROGRESS NOTES
Pharmacy consulted to assist with the management of vancomycin in this patient to treat: bone/joint left foot    Patient weight: 52.2kg  Patient CrCl: ~45ml/min    Will begin vancomycin: 1000mg every 24 hours    Vancomycin level ordered appropriately.     Pharmacy will continue to monitor and make adjustments as needed.      Thank you for the consult,    Shiela Wall, PharmD  [phone]

## 2023-02-01 NOTE — CONSULTS
[facility]  Adult Nutrition  Consult Note         Reason for Assessment  Reason For Assessment: consult (failure to thrive and multiple wounds)   Nutrition Risk Screen: difficulty chewing/swallowing    Assessment and Plan  89 year old male was brought to the ED at [facility] due to weakness and foul smelling wound of the right foot. Patient is non verbal, has dementia and is bed bound therefor the Hx is provided by the daughter who is the care giver.  Patient started having mucus built up and spitting up at home 4-5 days ago, requiring suction. He started having weakness, decrease appetite during this time and skipping meals until for the past 2 days he stopped eating completely therefore the daughter decided to bring him into the ED; reports patient is not at his baseline and is less responsive and more confuse than usual.     Patient has 4 wounds, one sacral wound and one on the left hip/left buttock that are not draining and do not look infectious. He also has 2 wounds on the left foot. One wound is on the dorsal surface of the foot and is not draining but the wound at the base of the 5th metatarsal/lateral aspect of the foot is draining purulent fluid, is tender to touch and is foul smelling. Daughter reports patient receives wound care at [facility] and has received IV antibiotics in the past because of the foot wounds.       Patient was at a normal level of functioning until 2016 when he was diagnosed with Parkinson and then started having dementia gradually. Patient was able to ambulate until 2018 when he had a huge decline and a fall then he became bed bound. He currently is non verbal at home. Patient has chronic constipation and uses enema every 6 days. 2 days ago was the last time enema was used and BM was very small in volume. Daughter reports they have been considering a PEG tube for the patient and have been in touch with a surgeon in [place] regarding PEG.      RD consult  "received and appreciated. Pt with MST score of 3. Pt seen today for nutrition assessment. Spoke with pt daughter at bedside, pt did not arouse during NFPE. Daughter reports he has been eating poorly since she beileves Nov/Dec, though degree of malnutrition appears his oral intakes have likely been poor for a prolonged period of time. Pt with severe muscle and fat mass loss present, bones easily palpated on NFPE. Daughter reports he only consumes a puree diet but that recently he has been eating less and less, typically eating something every other day. Reports at one time she was noticing his gums were red, inflamed, and bleeding and she thought this could be contributing to his decreased intakes. Stated she provided oral care and this helped him consume more 1-2 days but then his intakes declined again. Reports he does not take any sort of daily supplement (Ensure, Boost, etc.) or vitamin supplement. Was providing him smoothies/baby food. She reports SLP evaluation with home health, but was unable to report their findings/recommendations, stating they just said his mental status was declined. Note PEG discussion in H&P.      Recommend SLP evaluation while inpatient to determine most appropriate route of nutrition for this patient. She reports she has a home suction device and was having to suction him more frequently, noting increased phlegm that she thinks was from a smoothie she was giving him. She also notes episodes where he was "chewing" on food in his mouth for prolonged periods of time, seems consistent with pocketing. Pt meeting malnutrition criteria for severe acute on chronic protein calorie malnutrition.     Recommend SLP evaluation to determine least restrictive diet for pt or if pt inappropriate for oral intakes. Recommend pending findings, if tube feedings recommended as route of nutrition support, recommend initiating Jevity 1.5 (or Isosource 1.5 if Jevity 1.5 unavailable)@ 10 ml/hr and hold at 10 " ml/hr x 24 hours due to degree of malnutrition and high risk for refeeding. Over initial 24 hours, monitor electrolytes (K, Mg, Phos) and replete to WNL as needed. If after 24 hours, K, Mg, Phos WNL, recommend begin advancing tube feedings by 10 ml q 6-8 hrs to goal rate of 45 ml/hr to provide: 1620 kcal, 68 g PRO, 743 ml free water per tube feeding formula per day. Recommend free water flush of 35 ml/hr, providing 840 ml free water/day. Due to wounds, also recommend addition of Angel(or Arginaid, which can be used in place of Angel if Angel out of stock) BID for additional 180 kcal, 5 g PRO. Total of 1800 kcal, 73 g PRO, 1583 ml free water per day.     Last BM 1/29 per flowsheets. Monitor labs, meds, weights, SLP findings, PEG discussions, tube feedings vs oral diet. RD following.     Malnutrition  Is Patient Malnourished: Yes Malnutrition Assessment  Malnutrition Type: acute illness or injury  Energy Intake: severe energy intake  Skin (Micronutrient): dry, pallor, thinned, wounds unhealed  Gums (Micronutrient): inflamed, red  Neck/Chest (Micronutrient): muscle wasting, bony prominence, neck veins distended, subcutaneous fat loss  Musculoskeletal/Lower Extremities: muscle wasting, subcutaneous fat loss       Energy Intake (Malnutrition): less than or equal to 50% for greater than or equal to 1 month  Subcutaneous Fat (Malnutrition): severe depletion  Muscle Mass (Malnutrition): severe depletion   Orbital Region (Subcutaneous Fat Loss): severe depletion  Upper Arm Region (Subcutaneous Fat Loss): severe depletion  Thoracic and Lumbar Region: severe depletion   [place] Region (Muscle Loss): severe depletion  Clavicle Bone Region (Muscle Loss): severe depletion  Clavicle and Acromion Bone Region (Muscle Loss): severe depletion  Scapular Bone Region (Muscle Loss): severe depletion  Dorsal Hand (Muscle Loss): severe depletion  Patellar Region (Muscle Loss): severe depletion  Anterior Thigh Region (Muscle Loss): severe  depletion  Posterior Calf Region (Muscle Loss): severe depletion       Subcutaneous Fat Loss (Final Summary): severe protein-calorie malnutrition  Muscle Loss Evaluation (Final Summary): severe protein-calorie malnutrition       Skin Integrity  George Risk Assessment  Sensory Perception: 2-->very limited  Moisture: 3-->occasionally moist  Activity: 1-->bedfast  Mobility: 1-->completely immobile  Nutrition: 2-->probably inadequate  Friction and Shear: 1-->problem  George Score: 10    Nutrition Diagnosis  Malnutrition (Severe)   related to Impaired cognitive or learning abilities /psychological causes/ Altered Mental Status as evidenced by severe muscle, fat mass loss, poor oral intakes    Nutrition Diagnosis Status: Worsening  Comments: monitor for SLP findings and PEG decisions  Nutrition Risk  Level of Risk/Frequency of Follow-up: high    Recent Labs   Lab 02/01/23  0510   GLU 90       Nutrition Prescription / Recommendations  Recommendation/Intervention: Recommend SLP evaluation to determine least restrictive diet for pt or if pt inappropriate for oral intakes. Recommend pending findings, if tube feedings recommended as route of nutrition support, recommend initiating Jevity 1.5 (or Isosource 1.5 if Jevity 1.5 unavailable)@ 10 ml/hr and hold at 10 ml/hr x 24 hours due to degree of malnutrition and high risk for refeeding. Over initial 24 hours, monitor electrolytes (K, Mg, Phos) and replete to WNL as needed. If after 24 hours, K, Mg, Phos WNL, recommend begin advancing tube feedings by 10 ml q 6-8 hrs to goal rate of 45 ml/hr to provide: 1620 kcal, 68 g PRO, 743 ml free water per tube feeding formula per day. Recommend free water flush of 35 ml/hr, providing 840 ml free water/day. Due to wounds, also recommend addition of Angel(or Arginaid, which can be used in place of Angel if Angel out of stock) BID for additional 180 kcal, 5 g PRO. Total of 1800 kcal, 73 g PRO, 1583 ml free water per day.  Goals: diet  initiation vs tube feeding initiation, weight stability, lab stability  Nutrition Goal Status: new  Current Diet Order: NPO    Recommended Diet: NPO-recommend SLP evaluation to determine least restrictive diet   Recommended Oral Supplement: No Oral Supplements  Is Nutrition Support Recommended: Yes     Needs Calculated  Energy Need Method: Kcal/kg Energy Calorie Requirements (kcal): 1066-3494 (30-35cals/kg)  Protein Requirements: 62-78 g PRO (1.2-1.5 g PRO/kg)  Enteral Nutrition   Enteral Nutrition Formula Provides:  1620 kcals   68 g Protein  232 g Carbohydrates  53 g Fat   743 ml Fluid without Flush    840 ml Fluid by flush   1583 ml Total Fluid  Enteral Nutrition Recommended Order:  Tube feeding via NG/ Oliver Sump  Tube feeding formula: Jevity 1.5 Continuous 45 ml/h  Free Water Flush: 35 ml hourly  Modular Supplements:No Modular Supplements needed and Angel 2 times a day  Enteral Nutrition meets needs?: yes  Enteral Nutrition Status: Recommended but Not Ordered  Is Education Recommended: No  Monitor and Evaluation  % current Intake: NPO  % intake to meet estimated needs: 75 - 100 %  Anthropometric Measurements: weight, weight change, body mass index  Biochemical Data, Medical Tests and Procedures: electrolyte and renal panel, lipid profile, gastrointestinal profile, glucose/endocrine profile, inflammatory profile  Nutrition-Focused Physical Findings: overall appearance, extremities, muscles and bones, head and eyes, skin     Current Medical Diagnosis and Past Medical History     Past Medical History:   Diagnosis Date    Parkinson's disease      Nutrition/Diet History  Food Allergies: NKFA  Factors Affecting Nutritional Intake: NPO  Lab/Procedures/Meds  Recent Labs   Lab 01/31/23  1857 02/01/23  0510   * 154*   K 3.9 3.4*   BUN 33* 26*   CREATININE 0.89 0.89   CALCIUM 9.0 8.2*   ALBUMIN 2.8*  --    * 117*   ALT 35  --    AST 38*  --    PHOS  --  3.1     Last A1c: No results found for: HGBA1C  Lab  "Results   Component Value Date    RBC 3.47 (L) 02/01/2023    HGB 9.5 (L) 02/01/2023    HCT 31.6 (L) 02/01/2023    MCV 91.1 02/01/2023    MCH 27.4 02/01/2023    MCHC 30.1 (L) 02/01/2023    TIBC 185 (L) 02/01/2023     Pertinent Labs Reviewed: reviewed  Pertinent Labs Comments: Sodium: 154 (H)  Potassium: 3.4 (L)  Chloride: 117 (H)  CO2: 30  Anion Gap: 10  BUN: 26 (H)  Creatinine: 0.89  BUN/CREAT RATIO: 29 (H)  eGFR: 82  Glucose: 90  Calcium: 8.2 (L)  Phosphorus: 3.1-suspect lab abnormalities r/t poor intakes/malnutrition   Pertinent Medications Reviewed: reviewed  Pertinent Medications Comments: deonpezil, carbidopa-levodopa, memantine, atrovastatin, zosyn, thiamine, vancomycin  Anthropometrics  Temp: 97.8 °F (36.6 °C)  Height Method: Stated  Height: 5' 8" (172.7 cm)  Height (inches): 68 in  Weight Method: Standard Scale  Weight: 52.2 kg (115 lb)  Weight (lb): 115 lb  Ideal Body Weight (IBW), Male: 154 lb  % Ideal Body Weight, Male (lb): 74.68 %  BMI (Calculated): 17.5  BMI Grade: 17 - 18.4 protein-energy malnutrition grade I     Estimated/Assessed Needs  Weight Used For Calorie Calculations: 52.5 kg (115 lb 11.9 oz)   Energy Need Method: Kcal/kg Energy Calorie Requirements (kcal): 0969-5011 (30-35cals/kg)  Weight Used For Protein Calculations: 52.2 kg (115 lb 1.3 oz)  Protein Requirements: 62-78 g PRO (1.2-1.5 g PRO/kg)  Estimated Fluid Requirement Method: RDA Method    RDA Method (mL): 1575     Nutrition by Nursing              Last Bowel Movement: 01/29/23              Nutrition Follow-Up  RD Follow-up?: Yes    "

## 2023-02-01 NOTE — ASSESSMENT & PLAN NOTE
After discussion with Dr. Almanza who attempted to obtain hospice and comfort measure status from a family, and after discussion with the family, it is decided to proceed with placement of percutaneous gastrostomy tube, with opened tube if indicated.  Patient's daughter expresses understanding to risks and benefits discussed including infection, bleeding, malpositioning, and potential problems with anesthesia with a heart or lungs.  She expresses understanding wishes to proceed.

## 2023-02-01 NOTE — PLAN OF CARE
[facility]  Initial Discharge Assessment       Primary Care Provider: Primary Doctor No    Admission Diagnosis: Weakness [R53.1]  Failure to thrive in adult [R62.7]  Open wound of foot [S91.309A]  Chest pain [R07.9]  Osteomyelitis of left foot, unspecified type [M86.9]    Admission Date: 1/31/2023  Expected Discharge Date:     Discharge Barriers Identified: None    Payor: HU[employer] MANAGED MEDICARE / Plan: HU[employer]NA MEDICARE HMO / Product Type: Capitation /     Extended Emergency Contact Information  Primary Emergency Contact: Aliyah Salazar  Mobile Phone: [phone]  Relation: Daughter  Preferred language: English   needed? No    Discharge Plan A: Home Health  Discharge Plan B: Home Health      [id number] - [place], [place] - [address] [facility] [address]  [address]  [facility] [address]  Phone: [phone] Fax: [phone]    [facility], [place] - [address]  [address]  [facility] [address]  Phone: [phone] Fax: [phone]      Initial Assessment (most recent)       Adult Discharge Assessment - 02/01/23 1300          Discharge Assessment    Assessment Type Discharge Planning Assessment     Confirmed/corrected address, phone number and insurance Yes     Confirmed Demographics Correct on Facesheet     Source of Information family     If unable to respond/provide information was family/caregiver contacted? Yes     Contact Name/Number Aliyah [phone]     Does patient/caregiver understand observation status Yes     Communicated ROB with patient/caregiver Date not available/Unable to determine     People in Home child(betsey), adult     Facility Arrived From: home     Do you expect to return to your current living situation? Yes     Do you have help at home or someone to help you manage your care at home? Yes     Who are your caregiver(s) and their phone number(s)? daughter Phtlzac     Prior to hospitilization cognitive  status: Unable to Assess     Current cognitive status: Unable to Assess     Home Layout Able to live on 1st floor     Equipment Currently Used at Home none     Readmission within 30 days? No     Patient currently being followed by outpatient case management? No     Do you currently have service(s) that help you manage your care at home? Yes     Name [facility] number of [facility]     Is the pt/caregiver preference to resume services with current agency Yes     Do you take prescription medications? Yes     Do you have prescription coverage? Yes     Do you have any problems affording any of your prescribed medications? No     Is the patient taking medications as prescribed? yes     How do you get to doctors appointments? family or friend will provide     Are you on dialysis? No     Do you take coumadin? No     Discharge Plan A Home Health     Discharge Plan B Home Health     DME Needed Upon Discharge  none     Discharge Plan discussed with: Adult children     Discharge Barriers Identified None        Physical Activity    On average, how many days per week do you engage in moderate to strenuous exercise (like a brisk walk)? 0 days     On average, how many minutes do you engage in exercise at this level? 0 min        Financial Resource Strain    How hard is it for you to pay for the very basics like food, housing, medical care, and heating? Not hard at all        Housing Stability    In the last 12 months, was there a time when you were not able to pay the mortgage or rent on time? No     In the last 12 months, how many places have you lived? 1     In the last 12 months, was there a time when you did not have a steady place to sleep or slept in a shelter (including now)? No        Transportation Needs    In the past 12 months, has lack of transportation kept you from medical appointments or from getting medications? No     In the past 12 months, has lack of transportation kept you from meetings, work, or from  getting things needed for daily living? No        Food Insecurity    Within the past 12 months, you worried that your food would run out before you got the money to buy more. Never true     Within the past 12 months, the food you bought just didn't last and you didn't have money to get more. Never true        Stress    Do you feel stress - tense, restless, nervous, or anxious, or unable to sleep at night because your mind is troubled all the time - these days? Not at all        Social Connections    In a typical week, how many times do you talk on the phone with family, friends, or neighbors? Once a week     How often do you get together with friends or relatives? Once a week     How often do you attend Temple or Catholic services? Never     Do you belong to any clubs or organizations such as Temple groups, unions, fraternal or athletic groups, or school groups? Yes     How often do you attend meetings of the clubs or organizations you belong to? Never     Are you , , , , never , or living with a partner?         Alcohol Use    Q1: How often do you have a drink containing alcohol? Never     Q2: How many drinks containing alcohol do you have on a typical day when you are drinking? Patient does not drink     Q3: How often do you have six or more drinks on one occasion? Never                   Spoke with pt's daughter now. Pt lives home with her plans to return when medically stable. [facility]. If SWB needed or LTAC choice for [facility] obtained. Updates sent to . Denies DME. Will follow dc needs as arise.

## 2023-02-01 NOTE — HPI
89 year old male was brought to the ED at [facility] due to weakness and foul smelling wound of the right foot. Patient is non verbal, has dementia and is bed bound therefor the Hx is provided by the daughter who is the care giver.  Patient started having mucus built up and spitting up at home 4-5 days ago, requiring suction. He started having weakness, decrease appetite during this time and skipping meals until for the past 2 days he stopped eating completely therefore the daughter decided to bring him into the ED. Daughter denies any vomiting, tenderness in the abdomen, diarrhea, abnormalities with the urine but reports patient is not at his baseline and is less responsive and more confuse than usual.    Patient has 4 wounds, one sacral wound and one on the left hip/left buttock that are not draining and do not look infectious. He also has 2 wounds on the left foot. One wound is on the dorsal surface of the foot and is not draining but the wound at the base of the 5th metatarsal/lateral aspect of the foot is draining purulent fluid, is tender to touch and is foul smelling. Daughter reports patient receives wound care at [facility] and has received IV antibiotics in the past because of the foot wounds.      Patient was at a normal level of functioning until 2016 when he was diagnosed with Parkinson and then started having dementia gradually. Patient was able to ambulate until 2018 when he had a huge decline and a fall then he became bed bound. He currently is non verbal at home. Patient has chronic constipation and uses enema every 6 days. 2 days ago was the last time enema was used and BM was very small in volume. Daughter reports they have been considering a PEG tube for the patient and have been in touch with a surgeon in [place] regarding PEG.    ED course:   Xray of the left foot: Erosion at the base of the 5th metatarsal may reflect osteomyelitis.  However, no comparisons available to determine the  chronicity of this finding.  Sodium was 154 with Cl 116.   Cr GFR and liver enzymes wnl  Troponin, BNP normal  WBC is not elevated but H/H os 10.2/34.8

## 2023-02-01 NOTE — ASSESSMENT & PLAN NOTE
H/H 10.2/34.8 with MCV if 91.6  Most likley anemia of chronic disease  Iron studies, B12 and folate are pending to r/o other causes of anemia

## 2023-02-01 NOTE — NURSING
Nurse attempted to insert in and out cath on patient. No return. Will try an external cath if available.

## 2023-02-01 NOTE — PROGRESS NOTES
[facility]  Wound Care    Patient Name:  Juliet Arboleda   MRN:  [id number]  Date: 2/1/2023  Diagnosis: Osteomyelitis of left foot    History:     Past Medical History:   Diagnosis Date    Parkinson's disease        Social History     Socioeconomic History    Marital status:    Tobacco Use    Smoking status: Former     Types: Cigarettes    Smokeless tobacco: Never   Substance and Sexual Activity    Alcohol use: Not Currently     Comment: Socially    Sexual activity: Not Currently     Social Determinants of Health     Financial Resource Strain: Low Risk     Difficulty of Paying Living Expenses: Not hard at all   Food Insecurity: No Food Insecurity    Worried About Running Out of Food in the Last Year: Never true    Ran Out of Food in the Last Year: Never true   Transportation Needs: No Transportation Needs    Lack of Transportation (Medical): No    Lack of Transportation (Non-Medical): No   Physical Activity: Inactive    Days of Exercise per Week: 0 days    Minutes of Exercise per Session: 0 min   Stress: No Stress Concern Present    Feeling of Stress : Not at all   Social Connections: Socially Isolated    Frequency of Communication with Friends and Family: Once a week    Frequency of Social Gatherings with Friends and Family: Once a week    Attends Yarsani Services: Never    Active Member of Clubs or Organizations: Yes    Attends Club or Organization Meetings: Never    Marital Status:    Housing Stability: Low Risk     Unable to Pay for Housing in the Last Year: No    Number of Places Lived in the Last Year: 1    Unstable Housing in the Last Year: No       Precautions:     Allergies as of 01/31/2023 - Reviewed 01/31/2023   Allergen Reaction Noted    Bactrim [sulfamethoxazole-trimethoprim]  01/31/2023       WOC Assessment Details/Treatment        02/01/23 1328   WOCN Assessment   WOCN Total Time (mins) 90   Visit Date 02/01/23   Visit Time 0930   Consult Type New   WOCN Speciality  Wound   Wound pressure   Number of Wounds 4   Continence Type Urinary;Fecal   Intervention chart review;assessed;changed;applied;coordination of care;team conference;orders   Teaching on-going   Pressure Injury Prevention    Check Moisture Management Pad Done   Heel protection technique Foam dressing        Altered Skin Integrity Left anterior Hip Ulceration Partial thickness tissue loss. Shallow open ulcer with a red or pink wound bed, without slough. Intact or Open/Ruptured Serum-filled blister.   No Date First Assessed or Time First Assessed found.   Altered Skin Integrity Present on Admission: yes  Side: Left  Orientation: anterior  Location: Hip  Primary Wound Type: Ulceration  Description of Altered Skin Integrity: Partial thickness tissue ...   Wound Image    Description of Altered Skin Integrity Partial thickness tissue loss. Shallow open ulcer with a red or pink wound bed, without slough. Intact or Open/Ruptured Serum-filled blister.   Dressing Appearance Intact;Moist drainage   Drainage Amount Small   Drainage Characteristics/Odor Serous   Appearance [place]   Periwound Area Intact;Dry   Wound Edges Defined;Open   Wound Length (cm) 1.2 cm   Wound Width (cm) 1.5 cm   Wound Surface Area (cm^2) 1.8 cm^2   Care Cleansed with:;Wound cleanser;Applied:;Skin Barrier   Dressing Changed;Hydrofiber   Periwound Care Moisture barrier applied   Dressing Change Due 02/02/23        Altered Skin Integrity 02/01/23 0930 Left anterior Foot #2 Ulceration Full thickness tissue loss. Base is covered by slough and/or eschar in the wound bed   Date First Assessed/Time First Assessed: 02/01/23 0930   Altered Skin Integrity Present on Admission: yes  Side: Left  Orientation: anterior  Location: (c) Foot  Wound Number: #2  Is this injury device related?: No  Primary Wound Type: Ulceration  Mick...   Wound Image    Description of Altered Skin Integrity Full thickness tissue loss. Base is covered by slough and/or eschar in the wound bed    Dressing Appearance Intact   Drainage Amount Small   Drainage Characteristics/Odor Purulent;Malodorous   Appearance Pink;Black;Tan   Black (%), Wound Tissue Color 50 %   Periwound Area Dry   Wound Edges Defined;Open   Wound Length (cm) 2.5 cm   Wound Width (cm) 0.7 cm   Wound Surface Area (cm^2) 1.75 cm^2   Care Cleansed with:;Antimicrobial agent;Wound cleanser;Applied:;Skin Barrier   Dressing Changed;Hydrofiber;Silicone;Foam   Dressing Change Due 02/02/23        Altered Skin Integrity Left anterior Foot #3 Ulceration Partial thickness tissue loss. Shallow open ulcer with a red or pink wound bed, without slough. Intact or Open/Ruptured Serum-filled blister.   No Date First Assessed or Time First Assessed found.   Altered Skin Integrity Present on Admission: yes  Side: Left  Orientation: anterior  Location: Foot  Wound Number: #3  Is this injury device related?: No  Primary Wound Type: Ulceration  Descripti...   Wound Image    Description of Altered Skin Integrity Partial thickness tissue loss. Shallow open ulcer with a red or pink wound bed, without slough. Intact or Open/Ruptured Serum-filled blister.   Dressing Appearance Intact   Drainage Amount Small   Drainage Characteristics/Odor Serous   Appearance Red;Fibrin   Red (%), Wound Tissue Color 75 %   Yellow (%), Wound Tissue Color 25 %   Wound Edges Defined;Irregular   Wound Length (cm) 1 cm   Wound Width (cm) 0.7 cm   Wound Surface Area (cm^2) 0.7 cm^2   Care Cleansed with:;Antimicrobial agent;Wound cleanser;Applied:;Skin Barrier   Dressing Hydrofiber;Silicone;Foam        Altered Skin Integrity 02/01/23 0930 medial Sacral spine #4 Full thickness tissue loss. Subcutaneous fat may be visible but bone, tendon or muscle are not exposed   Date First Assessed/Time First Assessed: 02/01/23 0930   Altered Skin Integrity Present on Admission: yes  Orientation: medial  Location: Sacral spine  Wound Number: #4  Is this injury device related?: No  Description of Altered  Skin Integrity: Full t...   Wound Image    Description of Altered Skin Integrity Partial thickness tissue loss. Shallow open ulcer with a red or pink wound bed, without slough. Intact or Open/Ruptured Serum-filled blister.   Dressing Appearance Intact   Drainage Amount Small   Drainage Characteristics/Odor Serous   Appearance Red;Pink   Red (%), Wound Tissue Color 70 %   Periwound Area Intact;Moist;Pink   Wound Edges Defined;Irregular   Wound Length (cm) 1.3 cm   Wound Width (cm) 1.2 cm   Wound Surface Area (cm^2) 1.56 cm^2   Care Cleansed with:;Wound cleanser;Applied:;Skin Barrier   Dressing Hydrofiber;Silicone;Foam   Dressing Change Due 02/02/23       [facility] Team consulted for ASI L hip, L ankle, top of L foot, sacral    Narrative: Pt received in bed, arouses during assessment, non-verbal, daughter at bedside.     Active Wounds: Stage 3 PI to coccyx, Stage 2 PI to L hip, Stage 2 PI to L ankle, Unstageable PI to dorsum of L foot.     Goals per TIME Model: Promote moist wound healing, Removal of slough/eschar, Reduce bioburden, and Educate on proper wound management post D/C     Barriers to Wound Healing: multiple co-morbidities poor vascular supply necrosis advanced age fragile skin no protective sensation malnutrition,     Recommendations made to primary team for dressing changes daily .     Orders placed.    Thank you for the consult.     We will continue to follow. See additional note under Notes TAB for tentative f/u plan/dates.    02/01/2023

## 2023-02-01 NOTE — SUBJECTIVE & OBJECTIVE
Past Medical History:   Diagnosis Date    Parkinson's disease        Past Surgical History:   Procedure Laterality Date    lung nodule removal N/A 1990       Review of patient's allergies indicates:   Allergen Reactions    Bactrim [sulfamethoxazole-trimethoprim]        No current facility-administered medications on file prior to encounter.     No current outpatient medications on file prior to encounter.     Family History       Problem Relation (Age of Onset)    No Known Problems Mother, Father          Tobacco Use    Smoking status: Former     Types: Cigarettes    Smokeless tobacco: Never   Substance and Sexual Activity    Alcohol use: Not Currently     Comment: Socially    Drug use: Not on file    Sexual activity: Not Currently     Review of Systems   Unable to perform ROS: Dementia   Objective:     Vital Signs (Most Recent):  Temp: 97.8 °F (36.6 °C) (01/31/23 1806)  Pulse: 62 (01/31/23 1806)  Resp: 20 (01/31/23 1806)  BP: (!) 115/96 (01/31/23 1806)  SpO2: 100 % (01/31/23 1806)   Vital Signs (24h Range):  Temp:  [97.8 °F (36.6 °C)] 97.8 °F (36.6 °C)  Pulse:  [62] 62  Resp:  [20] 20  SpO2:  [100 %] 100 %  BP: (115)/(96) 115/96     Weight: 52.2 kg (115 lb)  Body mass index is 17.49 kg/m².    Physical Exam  Vitals and nursing note reviewed.   Constitutional:       General: He is not in acute distress.     Appearance: Normal appearance. He is ill-appearing. He is not toxic-appearing.   HENT:      Head: Normocephalic.      Right Ear: External ear normal.      Left Ear: External ear normal.      Nose: Nose normal. No congestion or rhinorrhea.      Mouth/Throat:      Mouth: Mucous membranes are dry.   Eyes:      Conjunctiva/sclera: Conjunctivae normal.   Cardiovascular:      Rate and Rhythm: Normal rate and regular rhythm.      Pulses: Normal pulses.      Heart sounds: Normal heart sounds. No murmur heard.    No friction rub.   Pulmonary:      Effort: Pulmonary effort is normal. No respiratory distress.      Breath  sounds: Normal breath sounds. No wheezing or rales.   Abdominal:      General: Abdomen is flat. There is no distension.      Palpations: Abdomen is soft.      Tenderness: There is no abdominal tenderness. There is no guarding.   Musculoskeletal:         General: Tenderness present. No swelling.      Right lower leg: No edema.      Left lower leg: No edema.        Feet:    Skin:     General: Skin is warm.      Coloration: Skin is not jaundiced.      Findings: Wound present. No lesion.      Comments: Sacral wound and a wound on the left hip/buttock    Neurological:      Mental Status: He is alert and oriented to person, place, and time.   Psychiatric:         Behavior: Behavior normal.           Significant Labs: All pertinent labs within the past 24 hours have been reviewed.    Significant Imaging: I have reviewed all pertinent imaging results/findings within the past 24 hours.

## 2023-02-02 ENCOUNTER — ANESTHESIA (OUTPATIENT)
Dept: SURGERY | Facility: HOSPITAL | Age: 88
DRG: 503 | End: 2023-02-02
Payer: MEDICARE

## 2023-02-02 ENCOUNTER — ANESTHESIA EVENT (OUTPATIENT)
Dept: SURGERY | Facility: HOSPITAL | Age: 88
DRG: 503 | End: 2023-02-02
Payer: MEDICARE

## 2023-02-02 VITALS
DIASTOLIC BLOOD PRESSURE: 75 MMHG | OXYGEN SATURATION: 100 % | HEART RATE: 74 BPM | RESPIRATION RATE: 19 BRPM | SYSTOLIC BLOOD PRESSURE: 164 MMHG

## 2023-02-02 LAB
25(OH)D3 SERPL-MCNC: 50.3 NG/ML
AMMONIA PLAS-SCNC: 16 ΜMOL/L (ref 11–32)
ANION GAP SERPL CALCULATED.3IONS-SCNC: 10 MMOL/L (ref 7–16)
BUN SERPL-MCNC: 14 MG/DL (ref 7–18)
BUN/CREAT SERPL: 16 (ref 6–20)
CALCIUM SERPL-MCNC: 8 MG/DL (ref 8.5–10.1)
CHLORIDE SERPL-SCNC: 113 MMOL/L (ref 98–107)
CO2 SERPL-SCNC: 30 MMOL/L (ref 21–32)
CREAT SERPL-MCNC: 0.89 MG/DL (ref 0.7–1.3)
EGFR (NO RACE VARIABLE) (RUSH/TITUS): 82 ML/MIN/1.73M²
GLUCOSE SERPL-MCNC: 103 MG/DL (ref 74–106)
MAGNESIUM SERPL-MCNC: 2.1 MG/DL (ref 1.7–2.3)
POTASSIUM SERPL-SCNC: 3.1 MMOL/L (ref 3.5–5.1)
SODIUM SERPL-SCNC: 150 MMOL/L (ref 136–145)
SYPHILIS AB INTERPRETATION: NORMAL

## 2023-02-02 PROCEDURE — D9220A PRA ANESTHESIA: Mod: CRNA,,, | Performed by: NURSE ANESTHETIST, CERTIFIED REGISTERED

## 2023-02-02 PROCEDURE — 63600175 PHARM REV CODE 636 W HCPCS

## 2023-02-02 PROCEDURE — 63600175 PHARM REV CODE 636 W HCPCS: Performed by: HOSPITALIST

## 2023-02-02 PROCEDURE — 82140 ASSAY OF AMMONIA: CPT | Performed by: HOSPITALIST

## 2023-02-02 PROCEDURE — 99233 SBSQ HOSP IP/OBS HIGH 50: CPT | Mod: ,,, | Performed by: HOSPITALIST

## 2023-02-02 PROCEDURE — 36000706: Performed by: SURGERY

## 2023-02-02 PROCEDURE — 25000003 PHARM REV CODE 250

## 2023-02-02 PROCEDURE — D9220A PRA ANESTHESIA: ICD-10-PCS | Mod: CRNA,,, | Performed by: NURSE ANESTHETIST, CERTIFIED REGISTERED

## 2023-02-02 PROCEDURE — 37000008 HC ANESTHESIA 1ST 15 MINUTES: Performed by: SURGERY

## 2023-02-02 PROCEDURE — 37000009 HC ANESTHESIA EA ADD 15 MINS: Performed by: SURGERY

## 2023-02-02 PROCEDURE — 71000039 HC RECOVERY, EACH ADD'L HOUR: Performed by: SURGERY

## 2023-02-02 PROCEDURE — 87070 CULTURE OTHR SPECIMN AEROBIC: CPT | Performed by: SURGERY

## 2023-02-02 PROCEDURE — 80048 BASIC METABOLIC PNL TOTAL CA: CPT | Performed by: HOSPITALIST

## 2023-02-02 PROCEDURE — 63600175 PHARM REV CODE 636 W HCPCS: Performed by: NURSE ANESTHETIST, CERTIFIED REGISTERED

## 2023-02-02 PROCEDURE — C1729 CATH, DRAINAGE: HCPCS | Performed by: SURGERY

## 2023-02-02 PROCEDURE — D9220A PRA ANESTHESIA: ICD-10-PCS | Mod: ANES,,, | Performed by: ANESTHESIOLOGY

## 2023-02-02 PROCEDURE — 82306 VITAMIN D 25 HYDROXY: CPT | Performed by: HOSPITALIST

## 2023-02-02 PROCEDURE — 71000033 HC RECOVERY, INTIAL HOUR: Performed by: SURGERY

## 2023-02-02 PROCEDURE — 36000707: Performed by: SURGERY

## 2023-02-02 PROCEDURE — 43830 PR GASTROSTOMY,OPEN,W/O TUBE CNSTR: ICD-10-PCS | Mod: ,,, | Performed by: SURGERY

## 2023-02-02 PROCEDURE — 11044 PR DEBRIDEMENT, SKIN, SUB-Q TISSUE,MUSCLE,BONE,=<20 SQ CM: ICD-10-PCS | Mod: 51,,, | Performed by: SURGERY

## 2023-02-02 PROCEDURE — 11000001 HC ACUTE MED/SURG PRIVATE ROOM

## 2023-02-02 PROCEDURE — 43830 GSTRST OPEN WO CONSTJ TUBE: CPT | Mod: ,,, | Performed by: SURGERY

## 2023-02-02 PROCEDURE — 25000003 PHARM REV CODE 250: Performed by: NURSE ANESTHETIST, CERTIFIED REGISTERED

## 2023-02-02 PROCEDURE — 83735 ASSAY OF MAGNESIUM: CPT | Performed by: HOSPITALIST

## 2023-02-02 PROCEDURE — 25000003 PHARM REV CODE 250: Performed by: HOSPITALIST

## 2023-02-02 PROCEDURE — D9220A PRA ANESTHESIA: Mod: ANES,,, | Performed by: ANESTHESIOLOGY

## 2023-02-02 PROCEDURE — 99233 PR SUBSEQUENT HOSPITAL CARE,LEVL III: ICD-10-PCS | Mod: ,,, | Performed by: HOSPITALIST

## 2023-02-02 PROCEDURE — 11044 DBRDMT BONE 1ST 20 SQ CM/<: CPT | Mod: 51,,, | Performed by: SURGERY

## 2023-02-02 PROCEDURE — 86780 TREPONEMA PALLIDUM: CPT | Performed by: HOSPITALIST

## 2023-02-02 RX ORDER — PROPOFOL 10 MG/ML
VIAL (ML) INTRAVENOUS
Status: DISCONTINUED | OUTPATIENT
Start: 2023-02-02 | End: 2023-02-02

## 2023-02-02 RX ORDER — ONDANSETRON 2 MG/ML
4 INJECTION INTRAMUSCULAR; INTRAVENOUS DAILY PRN
Status: DISCONTINUED | OUTPATIENT
Start: 2023-02-02 | End: 2023-02-07 | Stop reason: HOSPADM

## 2023-02-02 RX ORDER — MEPERIDINE HYDROCHLORIDE 25 MG/ML
25 INJECTION INTRAMUSCULAR; INTRAVENOUS; SUBCUTANEOUS EVERY 10 MIN PRN
Status: ACTIVE | OUTPATIENT
Start: 2023-02-02 | End: 2023-02-02

## 2023-02-02 RX ORDER — MORPHINE SULFATE 10 MG/ML
4 INJECTION INTRAMUSCULAR; INTRAVENOUS; SUBCUTANEOUS EVERY 5 MIN PRN
Status: DISCONTINUED | OUTPATIENT
Start: 2023-02-02 | End: 2023-02-06

## 2023-02-02 RX ORDER — EPHEDRINE SULFATE 50 MG/ML
INJECTION, SOLUTION INTRAVENOUS
Status: DISCONTINUED | OUTPATIENT
Start: 2023-02-02 | End: 2023-02-02

## 2023-02-02 RX ORDER — GLYCOPYRROLATE 0.2 MG/ML
INJECTION INTRAMUSCULAR; INTRAVENOUS
Status: DISCONTINUED | OUTPATIENT
Start: 2023-02-02 | End: 2023-02-02

## 2023-02-02 RX ORDER — SODIUM CHLORIDE, SODIUM LACTATE, POTASSIUM CHLORIDE, CALCIUM CHLORIDE 600; 310; 30; 20 MG/100ML; MG/100ML; MG/100ML; MG/100ML
125 INJECTION, SOLUTION INTRAVENOUS CONTINUOUS
Status: DISCONTINUED | OUTPATIENT
Start: 2023-02-02 | End: 2023-02-03

## 2023-02-02 RX ORDER — ROCURONIUM BROMIDE 10 MG/ML
INJECTION, SOLUTION INTRAVENOUS
Status: DISCONTINUED | OUTPATIENT
Start: 2023-02-02 | End: 2023-02-02

## 2023-02-02 RX ORDER — LIDOCAINE HYDROCHLORIDE 20 MG/ML
INJECTION, SOLUTION EPIDURAL; INFILTRATION; INTRACAUDAL; PERINEURAL
Status: DISCONTINUED | OUTPATIENT
Start: 2023-02-02 | End: 2023-02-02

## 2023-02-02 RX ORDER — SODIUM CHLORIDE, SODIUM LACTATE, POTASSIUM CHLORIDE, CALCIUM CHLORIDE 600; 310; 30; 20 MG/100ML; MG/100ML; MG/100ML; MG/100ML
INJECTION, SOLUTION INTRAVENOUS CONTINUOUS PRN
Status: DISCONTINUED | OUTPATIENT
Start: 2023-02-02 | End: 2023-02-02

## 2023-02-02 RX ADMIN — SODIUM CHLORIDE, POTASSIUM CHLORIDE, SODIUM LACTATE AND CALCIUM CHLORIDE: 600; 310; 30; 20 INJECTION, SOLUTION INTRAVENOUS at 01:02

## 2023-02-02 RX ADMIN — ROCURONIUM BROMIDE 50 MG: 10 INJECTION, SOLUTION INTRAVENOUS at 01:02

## 2023-02-02 RX ADMIN — PROPOFOL 20 MG: 10 INJECTION, EMULSION INTRAVENOUS at 01:02

## 2023-02-02 RX ADMIN — PIPERACILLIN AND TAZOBACTAM 4.5 G: 4; .5 INJECTION, POWDER, FOR SOLUTION INTRAVENOUS; PARENTERAL at 01:02

## 2023-02-02 RX ADMIN — PROPOFOL 40 MG: 10 INJECTION, EMULSION INTRAVENOUS at 01:02

## 2023-02-02 RX ADMIN — EPHEDRINE SULFATE 30 MG: 50 INJECTION INTRAVENOUS at 01:02

## 2023-02-02 RX ADMIN — GLYCOPYRROLATE 0.2 MG: 0.2 INJECTION INTRAMUSCULAR; INTRAVENOUS at 01:02

## 2023-02-02 RX ADMIN — PIPERACILLIN AND TAZOBACTAM 4.5 G: 4; .5 INJECTION, POWDER, FOR SOLUTION INTRAVENOUS; PARENTERAL at 05:02

## 2023-02-02 RX ADMIN — EPHEDRINE SULFATE 20 MG: 50 INJECTION INTRAVENOUS at 01:02

## 2023-02-02 RX ADMIN — SUGAMMADEX 200 MG: 100 INJECTION, SOLUTION INTRAVENOUS at 02:02

## 2023-02-02 RX ADMIN — PIPERACILLIN AND TAZOBACTAM 4.5 G: 4; .5 INJECTION, POWDER, FOR SOLUTION INTRAVENOUS; PARENTERAL at 09:02

## 2023-02-02 RX ADMIN — LIDOCAINE HYDROCHLORIDE 20 MG: 20 INJECTION, SOLUTION EPIDURAL; INFILTRATION; INTRACAUDAL; PERINEURAL at 01:02

## 2023-02-02 RX ADMIN — VANCOMYCIN HYDROCHLORIDE 1000 MG: 1 INJECTION, POWDER, LYOPHILIZED, FOR SOLUTION INTRAVENOUS at 01:02

## 2023-02-02 NOTE — OP NOTE
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  IV sedation was administered. The abdomen was prepped and draped in a sterile fashion. In the scope was then introduced transorally and advanced carefully into the stomach. Abdominal wall was then palpated under direct visualization, but the external palpation could never be visualized, and illumination of stomach could not be visualized externally.  It was felt that the stomach was either too high and located under the ribs, or that the abdominal wall was too thick.  Ultimately it was decided that an open procedure would be required.  The patient was then intubated and general anesthesia was administered.  The abdomen was prepped and draped standard fashion. Incision was then performed in the upper midline and carried through subcutaneous tissue to the fascia. The fascia was incised and peritoneum carefully entered. Fundus of the stomach was identified and grasped with [place] clamp. A pursestring suture was then placed in the wall of the stomach. A stab incision was then performed in the subcostal position to the left of the wound. The gastrostomy tube was passed across the abdominal  wall with Yudith clamp. The balloon of the gastrostomy tube was tested. Gastrotomy was then created in the center of the pursestring. Subsequent to this, gastrostomy tube was inserted, balloon inflated, and pursestring suture tied.  Two silk sutures were used to tack the stomach to the abdominal wall at the site of the gastrostomy.  After pulling the tube snug to the stomach wall and abdominal wall, the flange of the gastrostomy tube was secured to the skin using interrupted nylon.  The wound was irrigated, fascia was closed using continuous running PDS. Following irrigation, the skin was reapproximated with subcuticular Vicryl. Sterile dressing was applied.  An abdominal binder was placed.      Subsequent to this, the foot was debrided.  There was an ulcer with exposed bone over the lateral aspect of the left foot.  Rongeur pliers was used to  debride the  5th metatarsal head, excising bone from the base of the wound.  This was sent for bone cultures.  Wound was then irrigated, and pressure was held for hemostasis.  The patient was awakened from anesthesia in stable condition.       Description of the Findings of the Procedure:      Percutaneous gastrostomy could not be performed, due to patient with contracted rectus abdominis muscle that would not allow transillumination or visualization of the external palpation.    There were no unusual findings on EGD, revealing no ulcerations, esophagitis or abnormal masses in the esophagus or stomach.      An open gastrostomy was performed using Ke gastrostomy technique.    The left lateral foot wound was debrided with a rongeur, excising bone which was cultured.      Assistant(s):  ELBERT Garcia    Complications: No    Estimated Blood Loss (EBL): 5           Implants: * No implants in log *    Specimens:  bone from proximal left 5th MT head for culture to micro  Specimen (24h ago, onward)      None             Condition: Good      Complications:  None

## 2023-02-02 NOTE — ANESTHESIA PROCEDURE NOTES
Intubation    Date/Time: 2/2/2023 1:52 PM  Performed by: Tj Gutierrez CRNA  Authorized by: Tony Moy MD     Intubation:     Induction:  Intravenous    Intubated:  Postinduction    Mask Ventilation:  Easy mask    Attempts:  1    Attempted By:  CRNA    Method of Intubation:  Direct    Blade:  Elizabeth 3    Laryngeal View Grade: Grade I - full view of cords      Difficult Airway Encountered?: No      Complications:  None    Airway Device:  Oral endotracheal tube    Airway Device Size:  7.5    Style/Cuff Inflation:  Cuffed (inflated to minimal occlusive pressure)    Inflation Amount (mL):  7    Tube secured:  22    Secured at:  The teeth    Placement Verified By:  Capnometry    Complicating Factors:  Poor neck/head extension    Findings Post-Intubation:  BS equal bilateral and atraumatic/condition of teeth unchanged  Notes:      Smooth, tolerated well

## 2023-02-02 NOTE — ANESTHESIA PREPROCEDURE EVALUATION
02/02/2023  Juliet Arboleda is a 89 y.o., male.      Pre-op Assessment    I have reviewed the Patient Summary Reports.     I have reviewed the Nursing Notes. I have reviewed the NPO Status.   I have reviewed the Medications.     Review of Systems  Anesthesia Hx:  No problems with previous Anesthesia    Social:  Non-Smoker, No Alcohol Use, Former Smoker    Hematology/Oncology:     Oncology Normal    -- Anemia:   EENT/Dental:EENT/Dental Normal   Cardiovascular:  Cardiovascular Normal     Pulmonary:   COPD    Renal/:  Renal/ Normal     Hepatic/GI:  Hepatic/GI Normal    Musculoskeletal:  Musculoskeletal Normal Failure to thrive   Neurological:  Neurology Normal parkinsons dz   Endocrine:  Endocrine Normal    Dermatological:  Skin Normal    Psych:  Psychiatric Normal           Physical Exam  General: Well nourished    Airway:  Mallampati: II / II  Mouth Opening: Normal  TM Distance: > 6 cm  Tongue: Normal  Neck ROM: Normal ROM    Chest/Lungs:  Clear to auscultation, Normal Respiratory Rate    Heart:  Rate: Normal  Rhythm: Regular Rhythm        Anesthesia Plan  Type of Anesthesia, risks & benefits discussed:    Anesthesia Type: MAC  Intra-op Monitoring Plan: Standard ASA Monitors  Post Op Pain Control Plan: multimodal analgesia  Induction:  IV  Informed Consent: Informed consent signed with the Patient and all parties understand the risks and agree with anesthesia plan.  All questions answered. Patient consented to blood products? Yes  ASA Score: 4  Day of Surgery Review of History & Physical: H&P Update referred to the surgeon/provider.I have interviewed and examined the patient. I have reviewed the patient's H&P dated: There are no significant changes. H&P completed by Anesthesiologist.    Ready For Surgery From Anesthesia Perspective.     .

## 2023-02-02 NOTE — PROGRESS NOTES
Pt seen earlier today  and charged by Dr Tam. Mr Arboleda with progressive dementia and failure to thrive. Takes in poorly. Bed ridden and doesn't talk. Multiple small pressure sores. Hx Parkinson dz.   Talked with daughter. Pt obvious poor quality of life. Unable to assist in any ADL for a while. Discussed poor prognosis and treatment options. Discussed conservation treatment options. Daughter would like for pt to remain full code and given option get PEG tube. Discussed with Dr Lutz and he to evaluate.   Continue hydration. Monitor. No extra charge by me.

## 2023-02-02 NOTE — ANESTHESIA POSTPROCEDURE EVALUATION
Anesthesia Post Evaluation    Patient: Juliet Arboleda    Procedure(s) Performed: Procedure(s) (LRB):  EGD, (N/A)  DEBRIDEMENT, FOOT (Left)  GASTROSTOMY (N/A)    Final Anesthesia Type: general      Patient location during evaluation: PACU  Patient participation: Yes- Able to Participate  Level of consciousness: awake and sedated  Post-procedure vital signs: reviewed and stable  Pain management: adequate  Airway patency: patent    PONV status at discharge: No PONV  Anesthetic complications: no      Cardiovascular status: blood pressure returned to baseline  Respiratory status: unassisted  Hydration status: euvolemic  Follow-up not needed.          Vitals Value Taken Time   /80 02/02/23 1523   Temp 98 02/02/23 1524   Pulse 79 02/02/23 1524   Resp 17 02/02/23 1524   SpO2 100 % 02/02/23 1524   Vitals shown include unvalidated device data.      No case tracking events are documented in the log.      Pain/Mark Score: Mark Score: 9 (2/2/2023  2:48 PM)

## 2023-02-02 NOTE — TRANSFER OF CARE
"Anesthesia Transfer of Care Note    Patient: Juliet Arboleda    Procedure(s) Performed: Procedure(s) (LRB):  EGD, (N/A)  DEBRIDEMENT, FOOT (Left)  GASTROSTOMY (N/A)    Patient location: PACU    Anesthesia Type: general    Transport from OR: Transported from OR on 2-3 L/min O2 by NC with adequate spontaneous ventilation    Post pain: adequate analgesia    Post assessment: no apparent anesthetic complications    Post vital signs: stable    Level of consciousness: responds to stimulation (returned to baseline)    Nausea/Vomiting: no nausea/vomiting    Complications: none    Transfer of care protocol was followedComments: Good SV continue, NAD noted, VSS, baseline return observed, RTRN      Last vitals:   Visit Vitals  BP (!) 143/75 (BP Location: Right leg, Patient Position: Lying)   Pulse 79   Temp 35.9 °C (96.7 °F) (Axillary)   Resp (!) 25   Ht 5' 8" (1.727 m)   Wt 52.2 kg (115 lb)   SpO2 100%   BMI 17.49 kg/m²     "

## 2023-02-02 NOTE — PLAN OF CARE
Problem: Adult Inpatient Plan of Care  Goal: Plan of Care Review  Outcome: Ongoing, Progressing  Flowsheets (Taken 2/2/2023 1659)  Plan of Care Reviewed With:   patient   spouse  Goal: Patient-Specific Goal (Individualized)  Outcome: Ongoing, Progressing  Goal: Absence of Hospital-Acquired Illness or Injury  Outcome: Ongoing, Progressing  Intervention: Prevent Skin Injury  Flowsheets (Taken 2/2/2023 1659)  Skin Protection: adhesive use limited  Intervention: Prevent and Manage VTE (Venous Thromboembolism) Risk  Flowsheets (Taken 2/2/2023 1659)  Range of Motion: active ROM (range of motion) encouraged  Intervention: Prevent Infection  Flowsheets (Taken 2/2/2023 1659)  Infection Prevention:   rest/sleep promoted   single patient room provided   hand hygiene promoted  Goal: Optimal Comfort and Wellbeing  Outcome: Ongoing, Progressing  Intervention: Monitor Pain and Promote Comfort  Flowsheets (Taken 2/2/2023 1659)  Pain Management Interventions:   pain management plan reviewed with patient/caregiver   quiet environment facilitated   relaxation techniques promoted  Intervention: Provide Person-Centered Care  Flowsheets (Taken 2/2/2023 1659)  Trust Relationship/Rapport:   care explained   choices provided   emotional support provided   empathic listening provided   thoughts/feelings acknowledged   reassurance provided   questions encouraged   questions answered  Goal: Readiness for Transition of Care  Outcome: Ongoing, Progressing     Problem: Impaired Wound Healing  Goal: Optimal Wound Healing  Outcome: Ongoing, Progressing  Intervention: Promote Wound Healing  Flowsheets (Taken 2/2/2023 1659)  Oral Nutrition Promotion: physical activity promoted  Pain Management Interventions:   pain management plan reviewed with patient/caregiver   quiet environment facilitated   relaxation techniques promoted     Problem: Skin Injury Risk Increased  Goal: Skin Health and Integrity  Outcome: Ongoing, Progressing  Intervention:  Optimize Skin Protection  Flowsheets (Taken 2/2/2023 1659)  Pressure Reduction Devices: specialty bed utilized  Skin Protection: adhesive use limited  Intervention: Promote and Optimize Oral Intake  Flowsheets (Taken 2/2/2023 1659)  Oral Nutrition Promotion: physical activity promoted     Problem: Fall Injury Risk  Goal: Absence of Fall and Fall-Related Injury  Outcome: Ongoing, Progressing  Intervention: Identify and Manage Contributors  Flowsheets (Taken 2/2/2023 1659)  Self-Care Promotion:   independence encouraged   BADL personal objects within reach  Medication Review/Management: medications reviewed   Plan of care reviewed. Patient progressing

## 2023-02-02 NOTE — PLAN OF CARE
Spoke with daughter now choice obtained for [facility] [phone], left message for  at facility. Will follow. Have not rc'd consult for SWB/SNF at this time.   1320    Spoke with daughter now. Her choice is for her father to go home with hh when ready. Sent wound care updates. Will follow.

## 2023-02-02 NOTE — PLAN OF CARE
Attempted egd assisted placement of peg tube, w/o success, converted to open procedure.   Donita Heller RN

## 2023-02-03 LAB
ANION GAP SERPL CALCULATED.3IONS-SCNC: 9 MMOL/L (ref 7–16)
ANISOCYTOSIS BLD QL SMEAR: ABNORMAL
BASOPHILS # BLD AUTO: 0.01 K/UL (ref 0–0.2)
BASOPHILS NFR BLD AUTO: 0.3 % (ref 0–1)
BUN SERPL-MCNC: 11 MG/DL (ref 7–18)
BUN/CREAT SERPL: 11 (ref 6–20)
CALCIUM SERPL-MCNC: 8.2 MG/DL (ref 8.5–10.1)
CHLORIDE SERPL-SCNC: 108 MMOL/L (ref 98–107)
CO2 SERPL-SCNC: 30 MMOL/L (ref 21–32)
CREAT SERPL-MCNC: 1.02 MG/DL (ref 0.7–1.3)
DIFFERENTIAL METHOD BLD: ABNORMAL
EGFR (NO RACE VARIABLE) (RUSH/TITUS): 70 ML/MIN/1.73M²
EOSINOPHIL # BLD AUTO: 0.03 K/UL (ref 0–0.5)
EOSINOPHIL NFR BLD AUTO: 0.8 % (ref 1–4)
EOSINOPHIL NFR BLD MANUAL: 2 % (ref 1–4)
ERYTHROCYTE [DISTWIDTH] IN BLOOD BY AUTOMATED COUNT: 15.7 % (ref 11.5–14.5)
GLUCOSE SERPL-MCNC: 126 MG/DL (ref 74–106)
HCT VFR BLD AUTO: 33.6 % (ref 40–54)
HGB BLD-MCNC: 10.2 G/DL (ref 13.5–18)
HYPOCHROMIA BLD QL SMEAR: ABNORMAL
IMM GRANULOCYTES # BLD AUTO: 0.23 K/UL (ref 0–0.04)
IMM GRANULOCYTES NFR BLD: 5.8 % (ref 0–0.4)
LYMPHOCYTES # BLD AUTO: 0.68 K/UL (ref 1–4.8)
LYMPHOCYTES NFR BLD AUTO: 17 % (ref 27–41)
LYMPHOCYTES NFR BLD MANUAL: 18 % (ref 27–41)
MCH RBC QN AUTO: 27.9 PG (ref 27–31)
MCHC RBC AUTO-ENTMCNC: 30.4 G/DL (ref 32–36)
MCV RBC AUTO: 91.8 FL (ref 80–96)
MICROORGANISM SPEC CULT: ABNORMAL
MONOCYTES # BLD AUTO: 0.4 K/UL (ref 0–0.8)
MONOCYTES NFR BLD AUTO: 10 % (ref 2–6)
MONOCYTES NFR BLD MANUAL: 9 % (ref 2–6)
MPC BLD CALC-MCNC: 11.2 FL (ref 9.4–12.4)
NEUTROPHILS # BLD AUTO: 2.65 K/UL (ref 1.8–7.7)
NEUTROPHILS NFR BLD AUTO: 66.1 % (ref 53–65)
NEUTS BAND NFR BLD MANUAL: 4 % (ref 1–5)
NEUTS SEG NFR BLD MANUAL: 67 % (ref 50–62)
NRBC # BLD AUTO: 0 X10E3/UL
NRBC, AUTO (.00): 0 %
PLATELET # BLD AUTO: 136 K/UL (ref 150–400)
PLATELET MORPHOLOGY: ABNORMAL
POTASSIUM SERPL-SCNC: 3.2 MMOL/L (ref 3.5–5.1)
RBC # BLD AUTO: 3.66 M/UL (ref 4.6–6.2)
SODIUM SERPL-SCNC: 144 MMOL/L (ref 136–145)
WBC # BLD AUTO: 4 K/UL (ref 4.5–11)

## 2023-02-03 PROCEDURE — 63600175 PHARM REV CODE 636 W HCPCS: Performed by: HOSPITALIST

## 2023-02-03 PROCEDURE — 25000003 PHARM REV CODE 250: Performed by: HOSPITALIST

## 2023-02-03 PROCEDURE — 11000001 HC ACUTE MED/SURG PRIVATE ROOM

## 2023-02-03 PROCEDURE — 85025 COMPLETE CBC W/AUTO DIFF WBC: CPT | Performed by: HOSPITALIST

## 2023-02-03 PROCEDURE — 80048 BASIC METABOLIC PNL TOTAL CA: CPT | Performed by: HOSPITALIST

## 2023-02-03 PROCEDURE — 25000003 PHARM REV CODE 250

## 2023-02-03 PROCEDURE — S5010 5% DEXTROSE AND 0.45% SALINE: HCPCS | Performed by: HOSPITALIST

## 2023-02-03 PROCEDURE — 99232 SBSQ HOSP IP/OBS MODERATE 35: CPT | Mod: ,,, | Performed by: HOSPITALIST

## 2023-02-03 PROCEDURE — 94761 N-INVAS EAR/PLS OXIMETRY MLT: CPT

## 2023-02-03 PROCEDURE — 63600175 PHARM REV CODE 636 W HCPCS

## 2023-02-03 PROCEDURE — 99232 PR SUBSEQUENT HOSPITAL CARE,LEVL II: ICD-10-PCS | Mod: ,,, | Performed by: HOSPITALIST

## 2023-02-03 RX ORDER — MUPIROCIN 20 MG/G
OINTMENT TOPICAL 2 TIMES DAILY
Status: DISCONTINUED | OUTPATIENT
Start: 2023-02-03 | End: 2023-02-07 | Stop reason: HOSPADM

## 2023-02-03 RX ADMIN — MUPIROCIN: 20 OINTMENT TOPICAL at 01:02

## 2023-02-03 RX ADMIN — POTASSIUM BICARBONATE 40 MEQ: 782 TABLET, EFFERVESCENT ORAL at 02:02

## 2023-02-03 RX ADMIN — POLYETHYLENE GLYCOL 3350 17 G: 17 POWDER, FOR SOLUTION ORAL at 09:02

## 2023-02-03 RX ADMIN — POTASSIUM BICARBONATE 40 MEQ: 782 TABLET, EFFERVESCENT ORAL at 09:02

## 2023-02-03 RX ADMIN — DEXTROSE AND SODIUM CHLORIDE: 5; 450 INJECTION, SOLUTION INTRAVENOUS at 02:02

## 2023-02-03 RX ADMIN — MEMANTINE 10 MG: 10 TABLET ORAL at 09:02

## 2023-02-03 RX ADMIN — PIPERACILLIN AND TAZOBACTAM 4.5 G: 4; .5 INJECTION, POWDER, FOR SOLUTION INTRAVENOUS; PARENTERAL at 09:02

## 2023-02-03 RX ADMIN — ATORVASTATIN CALCIUM 40 MG: 40 TABLET, FILM COATED ORAL at 09:02

## 2023-02-03 RX ADMIN — FOLIC ACID: 5 INJECTION, SOLUTION INTRAMUSCULAR; INTRAVENOUS; SUBCUTANEOUS at 01:02

## 2023-02-03 RX ADMIN — DONEPEZIL HYDROCHLORIDE 10 MG: 5 TABLET, FILM COATED ORAL at 09:02

## 2023-02-03 RX ADMIN — VANCOMYCIN HYDROCHLORIDE 1000 MG: 1 INJECTION, POWDER, LYOPHILIZED, FOR SOLUTION INTRAVENOUS at 02:02

## 2023-02-03 RX ADMIN — PIPERACILLIN AND TAZOBACTAM 4.5 G: 4; .5 INJECTION, POWDER, FOR SOLUTION INTRAVENOUS; PARENTERAL at 06:02

## 2023-02-03 RX ADMIN — PIPERACILLIN AND TAZOBACTAM 4.5 G: 4; .5 INJECTION, POWDER, FOR SOLUTION INTRAVENOUS; PARENTERAL at 02:02

## 2023-02-03 RX ADMIN — POTASSIUM BICARBONATE 40 MEQ: 782 TABLET, EFFERVESCENT ORAL at 08:02

## 2023-02-03 RX ADMIN — CARBIDOPA AND LEVODOPA 1 TABLET: 25; 100 TABLET ORAL at 09:02

## 2023-02-03 NOTE — HOSPITAL COURSE
02/02 To get PEG. Pt awake. Very cachectic. Start feeding afterwards. To swb. Supportive care.  02/03 Had to have open gastrectomy. Feeding to start slowly in am. Monitor. Friend in room.  02/04 No new issues. Feedings to be started. Monitor. Very weak.  02/05 Tolerating feeding so far Friend and daughter in room and answered questions. They still wish to take pt home at [place] and get HH to help.  02/06 Tolerating feeding. Surgery now Ok discharge. Family wishes to take home with home health. Talked to pt friend and pt had recently taking Bactrim. He was noted to have a pressure sore not noted before and that was why she thought was a drug reaction but clearly not. Not clearly osteomyelitis but Bactrim would cover. Will treat with bactrim 4 weeks. SW setup HH for home to help family care for PEG tube.  02/07--no new issues or concerns, tolerating feedings.  Has appointment this AM in [place].  Med rec completed per attending.  Stable for discharge.

## 2023-02-03 NOTE — PLAN OF CARE
Problem: Skin Injury Risk Increased  Goal: Skin Health and Integrity  Outcome: Ongoing, Progressing  Intervention: Optimize Skin Protection  Flowsheets (Taken 2/3/2023 0350)  Pressure Reduction Techniques:   positioned off wounds   pressure points protected  Pressure Reduction Devices:   foam padding utilized   heel offloading device utilized   positioning supports utilized  Skin Protection:   adhesive use limited   drying agents applied   hydrocolloids used   incontinence pads utilized   tubing/devices free from skin contact   skin-to-skin areas padded  Head of Bed (HOB) Positioning: HOB at 20-30 degrees     Problem: Fall Injury Risk  Goal: Absence of Fall and Fall-Related Injury  Outcome: Ongoing, Progressing  Intervention: Identify and Manage Contributors  Flowsheets (Taken 2/3/2023 0350)  Medication Review/Management: medications reviewed  Intervention: Promote Injury-Free Environment  Flowsheets (Taken 2/3/2023 0350)  Safety Promotion/Fall Prevention:   assistive device/personal item within reach   side rails raised x 2   family to remain at bedside

## 2023-02-03 NOTE — PROGRESS NOTES
[facility] - Orthopedic  General Surgery  Progress Note    Subjective:     History of Present Illness:  89-year-old male patient admitted to the hospital last night due to failure to thrive.  He has had a progressive decline in his mental status and only eat small amount of pureed food every of the day, according to his daughter.  The primary service has talked to the family extensively about hospice and comfort care, but they are not ready to commit him to this.  A feeding tube is requested.    In addition, the patient was noted to have some foot wounds and a left foot x-ray was performed revealing erosion at the base of the 5th metatarsal head, laterally.  Surgery was asked to evaluate this, as well.      Post-Op Info:  Procedure(s) (LRB):  EGD, (N/A)  DEBRIDEMENT, FOOT (Left)  GASTROSTOMY (N/A)   1 Day Post-Op     Interval History:   PEG placed yesterday, as well as excision of bone from the 5th metatarsal head.  Bone culture preliminary results isolated heavy growth staph aureus and Gram-negative bacilli.  On vancomycin and Zosyn.  No erythema or drainage from the PEG site.  Granulated tissue at the base of foot wounds.  Dressing changed today.    Medications:  Continuous Infusions:   dextrose 5 % and 0.45 % NaCl 100 mL/hr at 02/03/23 0234     Scheduled Meds:   atorvastatin  40 mg Oral Daily    carbidopa-levodopa  mg  1 tablet Oral Daily    donepeziL  10 mg Oral Daily    memantine  10 mg Oral Daily    mupirocin   Nasal BID    piperacillin-tazobactam (ZOSYN) IVPB  4.5 g Intravenous Q8H    polyethylene glycol  17 g Oral Daily    potassium bicarbonate  40 mEq Per G Tube TID    Banana bag   Intravenous Daily    vancomycin (VANCOCIN) IVPB  1,000 mg Intravenous Q24H     PRN Meds:acetaminophen, acetaminophen, dextrose 10%, dextrose 10%, glucagon (human recombinant), glucose, glucose, morphine, naloxone, ondansetron, ondansetron, sodium chloride 0.9%, vancomycin - pharmacy to dose     Review  of patient's allergies indicates:   Allergen Reactions    Bactrim [sulfamethoxazole-trimethoprim]      Objective:     Vital Signs (Most Recent):  Temp: 98 °F (36.7 °C) (02/03/23 1236)  Pulse: 70 (02/03/23 1236)  Resp: 20 (02/03/23 1236)  BP: (!) 87/51 (02/03/23 1236)  SpO2: 100 % (02/03/23 1236)   Vital Signs (24h Range):  Temp:  [94.2 °F (34.6 °C)-98.1 °F (36.7 °C)] 98 °F (36.7 °C)  Pulse:  [] 70  Resp:  [11-25] 20  SpO2:  [95 %-100 %] 100 %  BP: ()/(49-87) 87/51     Weight: 52.2 kg (115 lb)  Body mass index is 17.49 kg/m².    Intake/Output - Last 3 Shifts         02/01 0700  02/02 0659 02/02 0700  02/03 0659 02/03 0700  02/04 0659    I.V. (mL/kg)  1050 (20.1)     IV Piggyback       Total Intake(mL/kg)  1050 (20.1)     Net  +1050            Urine Occurrence 3 x 2 x             Physical Exam  Vitals reviewed.   Constitutional:       Comments: Cachexic   HENT:      Head: Normocephalic.      Mouth/Throat:      Mouth: Mucous membranes are moist.   Cardiovascular:      Rate and Rhythm: Normal rate.   Pulmonary:      Effort: Pulmonary effort is normal. No respiratory distress.   Abdominal:      General: There is no distension.      Palpations: Abdomen is soft.      Tenderness: There is no abdominal tenderness.      Comments: No erythema or drainage at the PEG site.  Clean dressing intact.   Skin:     General: Skin is warm and dry.      Comments: Small wounds of the ventral and lateral left foot have granulated tissue at the base without purulent drainage   Neurological:      Mental Status: Mental status is at baseline.      Comments: Aphasic, does not follow commands       Significant Labs:  I have reviewed all pertinent lab results within the past 24 hours.    Significant Diagnostics:  I have reviewed all pertinent imaging results/findings within the past 24 hours.    Assessment/Plan:     Cachexia  After discussion with Dr. Almanza who attempted to obtain hospice and comfort measure status from a family,  and after discussion with the family, it is decided to proceed with placement of percutaneous gastrostomy tube, with opened tube if indicated.  Patient's daughter expresses understanding to risks and benefits discussed including infection, bleeding, malpositioning, and potential problems with anesthesia with a heart or lungs.  She expresses understanding wishes to proceed.    Osteomyelitis of left foot  Evaluate in the OR to see if bone is exposed, with debridement if indicated    02/03/2023   Bone fragment isolated staph aureus and Gram-negative bacilli on preliminary culture.  Vanc and Zosyn  Dressing change with wet-to-dry      Malcom Dias, SABINO  General Surgery  [facility] - Orthopedic

## 2023-02-03 NOTE — ASSESSMENT & PLAN NOTE
BMI of 17.49  Patient is unable to feed himself and daughter is the caregiver   Patient can only have Puree food.   Family has considered PEG tube and had an appointment today at [facility] to consult a surgeon regarding PEG- could not go to the appointment since they came to the ED  Patient has not ate anything in the last 2 days, has had decrease oral intake in the last 5 days  D5 half normal saline with rate of 75 cc/hr  refeeding syndrome precautions - high dose Thiamine IV ordered daily  Phosphate levels pending

## 2023-02-03 NOTE — SUBJECTIVE & OBJECTIVE
Interval History:     Review of Systems   Constitutional:  Positive for activity change, appetite change and fatigue. Negative for fever.   HENT:  Positive for trouble swallowing. Negative for congestion and hearing loss.    Respiratory:  Negative for chest tightness, shortness of breath and wheezing.    Cardiovascular:  Negative for chest pain and palpitations.   Gastrointestinal:  Negative for abdominal pain, constipation and nausea.   Genitourinary:  Negative for difficulty urinating and dysuria.   Musculoskeletal:  Negative for back pain and neck stiffness.   Skin:  Negative for pallor and rash.   Neurological:  Positive for speech difficulty. Negative for dizziness and headaches.   Psychiatric/Behavioral:  Positive for confusion. Negative for suicidal ideas.    Objective:     Vital Signs (Most Recent):  Temp: 97.7 °F (36.5 °C) (02/02/23 1930)  Pulse: 100 (02/02/23 1930)  Resp: 18 (02/02/23 1622)  BP: (!) 112/58 (02/02/23 1930)  SpO2: 100 % (02/02/23 1930)   Vital Signs (24h Range):  Temp:  [94.2 °F (34.6 °C)-98 °F (36.7 °C)] 97.7 °F (36.5 °C)  Pulse:  [] 100  Resp:  [11-25] 18  SpO2:  [95 %-100 %] 100 %  BP: ()/(49-87) 112/58     Weight: 52.2 kg (115 lb)  Body mass index is 17.49 kg/m².    Intake/Output Summary (Last 24 hours) at 2/2/2023 2200  Last data filed at 2/2/2023 1443  Gross per 24 hour   Intake 1050 ml   Output --   Net 1050 ml      Physical Exam  Vitals reviewed.   Constitutional:       General: He is awake. He is not in acute distress.     Appearance: He is well-developed. He is cachectic. He is not toxic-appearing.   HENT:      Head: Normocephalic.      Nose: Nose normal.      Mouth/Throat:      Pharynx: Oropharynx is clear.   Eyes:      Extraocular Movements: Extraocular movements intact.      Pupils: Pupils are equal, round, and reactive to light.   Neck:      Thyroid: No thyroid mass.      Vascular: No carotid bruit.   Cardiovascular:      Rate and Rhythm: Normal rate and regular  rhythm.      Pulses: Normal pulses.      Heart sounds: Normal heart sounds. No murmur heard.  Pulmonary:      Effort: Pulmonary effort is normal.      Breath sounds: Normal breath sounds and air entry. No wheezing.   Abdominal:      General: Bowel sounds are normal. There is no distension.      Palpations: Abdomen is soft.      Tenderness: There is no abdominal tenderness.   Musculoskeletal:         General: Normal range of motion.      Cervical back: Neck supple. No rigidity.   Skin:     General: Skin is warm.      Coloration: Skin is not jaundiced.      Findings: No lesion.   Neurological:      General: No focal deficit present.      Mental Status: He is alert. He is disoriented.      Cranial Nerves: No cranial nerve deficit.   Psychiatric:         Attention and Perception: Attention normal.         Behavior: Behavior is cooperative.         Cognition and Memory: Cognition normal.      Comments: Not talking or following commands       Significant Labs: All pertinent labs within the past 24 hours have been reviewed.  BMP:   Recent Labs   Lab 02/02/23  0720      *   K 3.1*   *   CO2 30   BUN 14   CREATININE 0.89   CALCIUM 8.0*   MG 2.1     CBC:   Recent Labs   Lab 02/01/23  0510   WBC 3.07*   HGB 9.5*   HCT 31.6*        CMP:   Recent Labs   Lab 02/01/23  0510 02/02/23  0720   * 150*   K 3.4* 3.1*   * 113*   CO2 30 30   GLU 90 103   BUN 26* 14   CREATININE 0.89 0.89   CALCIUM 8.2* 8.0*   ANIONGAP 10 10       Significant Imaging: I have reviewed all pertinent imaging results/findings within the past 24 hours.

## 2023-02-03 NOTE — SUBJECTIVE & OBJECTIVE
Interval History:   PEG placed yesterday, as well as excision of bone from the 5th metatarsal head.  Bone culture preliminary results isolated heavy growth staph aureus and Gram-negative bacilli.  On vancomycin and Zosyn.  No erythema or drainage from the PEG site.  Granulated tissue at the base of foot wounds.  Dressing changed today.    Medications:  Continuous Infusions:   dextrose 5 % and 0.45 % NaCl 100 mL/hr at 02/03/23 0234     Scheduled Meds:   atorvastatin  40 mg Oral Daily    carbidopa-levodopa  mg  1 tablet Oral Daily    donepeziL  10 mg Oral Daily    memantine  10 mg Oral Daily    mupirocin   Nasal BID    piperacillin-tazobactam (ZOSYN) IVPB  4.5 g Intravenous Q8H    polyethylene glycol  17 g Oral Daily    potassium bicarbonate  40 mEq Per G Tube TID    Banana bag   Intravenous Daily    vancomycin (VANCOCIN) IVPB  1,000 mg Intravenous Q24H     PRN Meds:acetaminophen, acetaminophen, dextrose 10%, dextrose 10%, glucagon (human recombinant), glucose, glucose, morphine, naloxone, ondansetron, ondansetron, sodium chloride 0.9%, vancomycin - pharmacy to dose     Review of patient's allergies indicates:   Allergen Reactions    Bactrim [sulfamethoxazole-trimethoprim]      Objective:     Vital Signs (Most Recent):  Temp: 98 °F (36.7 °C) (02/03/23 1236)  Pulse: 70 (02/03/23 1236)  Resp: 20 (02/03/23 1236)  BP: (!) 87/51 (02/03/23 1236)  SpO2: 100 % (02/03/23 1236)   Vital Signs (24h Range):  Temp:  [94.2 °F (34.6 °C)-98.1 °F (36.7 °C)] 98 °F (36.7 °C)  Pulse:  [] 70  Resp:  [11-25] 20  SpO2:  [95 %-100 %] 100 %  BP: ()/(49-87) 87/51     Weight: 52.2 kg (115 lb)  Body mass index is 17.49 kg/m².    Intake/Output - Last 3 Shifts         02/01 0700  02/02 0659 02/02 0700  02/03 0659 02/03 0700  02/04 0659    I.V. (mL/kg)  1050 (20.1)     IV Piggyback       Total Intake(mL/kg)  1050 (20.1)     Net  +1050            Urine Occurrence 3 x 2 x             Physical Exam  Vitals reviewed.   Constitutional:        Comments: Cachexic   HENT:      Head: Normocephalic.      Mouth/Throat:      Mouth: Mucous membranes are moist.   Cardiovascular:      Rate and Rhythm: Normal rate.   Pulmonary:      Effort: Pulmonary effort is normal. No respiratory distress.   Abdominal:      General: There is no distension.      Palpations: Abdomen is soft.      Tenderness: There is no abdominal tenderness.      Comments: No erythema or drainage at the PEG site.  Clean dressing intact.   Skin:     General: Skin is warm and dry.      Comments: Small wounds of the ventral and lateral left foot have granulated tissue at the base without purulent drainage   Neurological:      Mental Status: Mental status is at baseline.      Comments: Aphasic, does not follow commands       Significant Labs:  I have reviewed all pertinent lab results within the past 24 hours.    Significant Diagnostics:  I have reviewed all pertinent imaging results/findings within the past 24 hours.

## 2023-02-03 NOTE — PLAN OF CARE
Pt will not dc this day. Per MD Almanza pt will possibly be ready next week. Will follow dc needs as arise.

## 2023-02-03 NOTE — PROGRESS NOTES
[facility]  [facility] Medicine  Progress Note    Patient Name: Juliet Arboleda  MRN: [id number]  Patient Class: IP- Inpatient   Admission Date: 1/31/2023  Length of Stay: 2 days  Attending Physician: Jerrod Almanza MD  Primary Care Provider: Primary Doctor No        Subjective:     Principal Problem:<principal problem not specified>        HPI:  89 year old male was brought to the ED at [facility] due to weakness and foul smelling wound of the right foot. Patient is non verbal, has dementia and is bed bound therefor the Hx is provided by the daughter who is the care giver.  Patient started having mucus built up and spitting up at home 4-5 days ago, requiring suction. He started having weakness, decrease appetite during this time and skipping meals until for the past 2 days he stopped eating completely therefore the daughter decided to bring him into the ED. Daughter denies any vomiting, tenderness in the abdomen, diarrhea, abnormalities with the urine but reports patient is not at his baseline and is less responsive and more confuse than usual.    Patient has 4 wounds, one sacral wound and one on the left hip/left buttock that are not draining and do not look infectious. He also has 2 wounds on the left foot. One wound is on the dorsal surface of the foot and is not draining but the wound at the base of the 5th metatarsal/lateral aspect of the foot is draining purulent fluid, is tender to touch and is foul smelling. Daughter reports patient receives wound care at [facility] and has received IV antibiotics in the past because of the foot wounds.      Patient was at a normal level of functioning until 2016 when he was diagnosed with Parkinson and then started having dementia gradually. Patient was able to ambulate until 2018 when he had a huge decline and a fall then he became bed bound. He currently is non verbal at home. Patient has chronic constipation and uses enema every 6 days. 2 days ago was  the last time enema was used and BM was very small in volume. Daughter reports they have been considering a PEG tube for the patient and have been in touch with a surgeon in [place] regarding PEG.    ED course:   Xray of the left foot: Erosion at the base of the 5th metatarsal may reflect osteomyelitis.  However, no comparisons available to determine the chronicity of this finding.  Sodium was 154 with Cl 116.   Cr GFR and liver enzymes wnl  Troponin, BNP normal  WBC is not elevated but H/H os 10.2/34.8        Overview/Hospital Course:  02/02 To get PEG. Pt awake. Very cachectic. Start feeding afterwards. To swb. Supportive care.      Interval History:     Review of Systems   Constitutional:  Positive for activity change, appetite change and fatigue. Negative for fever.   HENT:  Positive for trouble swallowing. Negative for congestion and hearing loss.    Respiratory:  Negative for chest tightness, shortness of breath and wheezing.    Cardiovascular:  Negative for chest pain and palpitations.   Gastrointestinal:  Negative for abdominal pain, constipation and nausea.   Genitourinary:  Negative for difficulty urinating and dysuria.   Musculoskeletal:  Negative for back pain and neck stiffness.   Skin:  Negative for pallor and rash.   Neurological:  Positive for speech difficulty. Negative for dizziness and headaches.   Psychiatric/Behavioral:  Positive for confusion. Negative for suicidal ideas.    Objective:     Vital Signs (Most Recent):  Temp: 97.7 °F (36.5 °C) (02/02/23 1930)  Pulse: 100 (02/02/23 1930)  Resp: 18 (02/02/23 1622)  BP: (!) 112/58 (02/02/23 1930)  SpO2: 100 % (02/02/23 1930)   Vital Signs (24h Range):  Temp:  [94.2 °F (34.6 °C)-98 °F (36.7 °C)] 97.7 °F (36.5 °C)  Pulse:  [] 100  Resp:  [11-25] 18  SpO2:  [95 %-100 %] 100 %  BP: ()/(49-87) 112/58     Weight: 52.2 kg (115 lb)  Body mass index is 17.49 kg/m².    Intake/Output Summary (Last 24 hours) at 2/2/2023 2200  Last data filed at  2/2/2023 1443  Gross per 24 hour   Intake 1050 ml   Output --   Net 1050 ml      Physical Exam  Vitals reviewed.   Constitutional:       General: He is awake. He is not in acute distress.     Appearance: He is well-developed. He is cachectic. He is not toxic-appearing.   HENT:      Head: Normocephalic.      Nose: Nose normal.      Mouth/Throat:      Pharynx: Oropharynx is clear.   Eyes:      Extraocular Movements: Extraocular movements intact.      Pupils: Pupils are equal, round, and reactive to light.   Neck:      Thyroid: No thyroid mass.      Vascular: No carotid bruit.   Cardiovascular:      Rate and Rhythm: Normal rate and regular rhythm.      Pulses: Normal pulses.      Heart sounds: Normal heart sounds. No murmur heard.  Pulmonary:      Effort: Pulmonary effort is normal.      Breath sounds: Normal breath sounds and air entry. No wheezing.   Abdominal:      General: Bowel sounds are normal. There is no distension.      Palpations: Abdomen is soft.      Tenderness: There is no abdominal tenderness.   Musculoskeletal:         General: Normal range of motion.      Cervical back: Neck supple. No rigidity.   Skin:     General: Skin is warm.      Coloration: Skin is not jaundiced.      Findings: No lesion.   Neurological:      General: No focal deficit present.      Mental Status: He is alert. He is disoriented.      Cranial Nerves: No cranial nerve deficit.   Psychiatric:         Attention and Perception: Attention normal.         Behavior: Behavior is cooperative.         Cognition and Memory: Cognition normal.      Comments: Not talking or following commands       Significant Labs: All pertinent labs within the past 24 hours have been reviewed.  BMP:   Recent Labs   Lab 02/02/23  0720      *   K 3.1*   *   CO2 30   BUN 14   CREATININE 0.89   CALCIUM 8.0*   MG 2.1     CBC:   Recent Labs   Lab 02/01/23  0510   WBC 3.07*   HGB 9.5*   HCT 31.6*        CMP:   Recent Labs   Lab  02/01/23  0510 02/02/23  0720   * 150*   K 3.4* 3.1*   * 113*   CO2 30 30   GLU 90 103   BUN 26* 14   CREATININE 0.89 0.89   CALCIUM 8.2* 8.0*   ANIONGAP 10 10       Significant Imaging: I have reviewed all pertinent imaging results/findings within the past 24 hours.      Assessment/Plan:      Failure to thrive in adult    Cachectic state to start enteral feeding per peg    Anemia of chronic disease    H/H 10.2/34.8 with MCV if 91.6  Most likley anemia of chronic disease  Iron studies, B12 and folate are pending to r/o other causes of anemia    HLD (hyperlipidemia)    Home Crestor was changed to lipitor 40 mg daily     Dementia associated with Parkinson's disease    Continue home meds carbidopa-levodopa, donepezol and memantine    Cachexia    BMI of 17.49  Patient is unable to feed himself and daughter is the caregiver   Patient can only have Puree food.   Family has considered PEG tube and had an appointment today at [place] to consult a surgeon regarding PEG- could not go to the appointment since they came to the ED  Patient has not ate anything in the last 2 days, has had decrease oral intake in the last 5 days  D5 half normal saline with rate of 75 cc/hr  refeeding syndrome precautions - high dose Thiamine IV ordered daily  Phosphate levels pending      Osteomyelitis of left foot    Xray: Erosion at the base of the 5th metatarsal may reflect osteomyelitis.  However, no comparisons available to determine the chronicity of this finding.  Wound culture of the latera wound at the base of the 5th metatarsal is pending  BC pending  Vanc and zosyn  Fluid maintenance   Surgery consulted  Wound care consulted   NPO  ESR, CRP, Lactic acid, Chest xray pending and UA is pending       VTE Risk Mitigation (From admission, onward)         Ordered     IP VTE HIGH RISK PATIENT  Once         01/31/23 2319     Place sequential compression device  Until discontinued         01/31/23 2319     Reason for No  Pharmacological VTE Prophylaxis  Once        Question:  Reasons:  Answer:  Physician Provided (leave comment)  Comment:  possible surgery tomorrow    01/31/23 7336                Discharge Planning   ROB:      Code Status: Full Code   Is the patient medically ready for discharge?:     Reason for patient still in hospital (select all that apply): Laboratory test, Treatment, Imaging, Consult recommendations and Pending disposition  Discharge Plan A: Home Health                  Jerrod Almanza MD  Department of Hospital Medicine   [facility]

## 2023-02-03 NOTE — PROGRESS NOTES
"[facility]  Adult Nutrition  Follow-up Note         Reason for Assessment  Reason For Assessment: RD follow-up  Nutrition Risk Screen: tube feeding or parenteral nutrition    RD follow up. RD secure chat attending MD regarding tube feeding recommendation; RD awaiting MD approval before entering order.     Recommend initiating Jevity 1.5 (or Isosource 1.5 if Jevity 1.5 unavailable)@ 10 ml/hr and hold at 10 ml/hr x 24 hours due to degree of malnutrition and high risk for refeeding. Over initial 24 hours, monitor electrolytes (K, Mg, Phos) and replete to WNL as needed. If after 24 hours, K, Mg, Phos WNL, recommend begin advancing tube feedings by 10 ml q 6-8 hrs to goal rate of 45 ml/hr to provide: 1620 kcal, 68 g PRO, 743 ml free water per tube feeding formula per day. Recommend free water flush of 35 ml/hr, providing 840 ml free water/day. Due to wounds, also recommend addition of Angel(or Arginaid, which can be used in place of Angel if Angel out of stock) BID for additional 180 kcal, 5 g PRO. Total of 1800 kcal, 73 g PRO, 1583 ml free water per day.     Keep HOB 30-45 degrees, monitor abdominal pain/distention, n/v/c/d, gastric residuals >500ml.    Per 2/2 MD note since last RD review,  "02/02 To get PEG. Pt awake. Very cachectic. Start feeding afterwards. To swb. Supportive care."    Per 2/02 surgery note:  "Description of the Findings of the Procedure:       Percutaneous gastrostomy could not be performed, due to patient with contracted rectus abdominis muscle that would not allow transillumination or visualization of the external palpation.    There were no unusual findings on EGD, revealing no ulcerations, esophagitis or abnormal masses in the esophagus or stomach.       An open gastrostomy was performed using Ke gastrostomy technique.    The left lateral foot wound was debrided with a rongeur, excising bone which was cultured"      Recommend initiating Jevity 1.5 (or Isosource 1.5 if " Jevity 1.5 unavailable)@ 10 ml/hr and hold at 10 ml/hr x 24 hours due to degree of malnutrition and high risk for refeeding. Over initial 24 hours, monitor electrolytes (K, Mg, Phos) and replete to WNL as needed. If after 24 hours, K, Mg, Phos WNL, recommend begin advancing tube feedings by 10 ml q 6-8 hrs to goal rate of 45 ml/hr to provide: 1620 kcal, 68 g PRO, 743 ml free water per tube feeding formula per day. Recommend free water flush of 35 ml/hr, providing 840 ml free water/day. Due to wounds, also recommend addition of Angel(or Arginaid, which can be used in place of Angel if Angel out of stock) BID for additional 180 kcal, 5 g PRO. Total of 1800 kcal, 73 g PRO, 1583 ml free water per day.     Last BM 1/29 per flowsheets - pt receiving polyethylene glycol per med list. Will continue to monitor tube feeding initiation/adequacy/tolerance, labs, meds, weights, updates in patient condition. RD following.     Malnutrition  Is Patient Malnourished: Yes     Malnutrition Assessment  Malnutrition Type: acute illness or injury  Energy Intake: severe energy intake  Skin (Micronutrient): dry, pallor, thinned, wounds unhealed  Gums (Micronutrient): inflamed, red  Neck/Chest (Micronutrient): muscle wasting, bony prominence, neck veins distended, subcutaneous fat loss  Musculoskeletal/Lower Extremities: muscle wasting, subcutaneous fat loss       Energy Intake (Malnutrition): less than or equal to 50% for greater than or equal to 1 month  Subcutaneous Fat (Malnutrition): severe depletion  Muscle Mass (Malnutrition): severe depletion   Orbital Region (Subcutaneous Fat Loss): severe depletion  Upper Arm Region (Subcutaneous Fat Loss): severe depletion  Thoracic and Lumbar Region: severe depletion   Castor Region (Muscle Loss): severe depletion  Clavicle Bone Region (Muscle Loss): severe depletion  Clavicle and Acromion Bone Region (Muscle Loss): severe depletion  Scapular Bone Region (Muscle Loss): severe depletion  Dorsal  "Hand (Muscle Loss): severe depletion  Patellar Region (Muscle Loss): severe depletion  Anterior Thigh Region (Muscle Loss): severe depletion  Posterior Calf Region (Muscle Loss): severe depletion       Subcutaneous Fat Loss (Final Summary): severe protein-calorie malnutrition  Muscle Loss Evaluation (Final Summary): severe protein-calorie malnutrition      RD consult received 2/01. Pt with MST score of 3. Pt seen 2/01 for nutrition assessment. Spoke with pt daughter at bedside, pt did not arouse during NFPE. Daughter reports he has been eating poorly since she beileves Nov/Dec, though degree of malnutrition appears his oral intakes have likely been poor for a prolonged period of time. Pt with severe muscle and fat mass loss present, bones easily palpated on NFPE. Daughter reports he only consumes a puree diet but that recently he has been eating less and less, typically eating something every other day. Reports at one time she was noticing his gums were red, inflamed, and bleeding and she thought this could be contributing to his decreased intakes. Stated she provided oral care and this helped him consume more 1-2 days but then his intakes declined again. Reports he does not take any sort of daily supplement (Ensure, Boost, etc.) or vitamin supplement. Was providing him smoothies/baby food. She reports SLP evaluation with home health, but was unable to report their findings/recommendations, stating they just said his mental status was declined. Note PEG discussion in H&P.       She reports she has a home suction device and was having to suction him more frequently, noting increased phlegm that she thinks was from a smoothie she was giving him. She also notes episodes where he was "chewing" on food in his mouth for prolonged periods of time, seems consistent with pocketing. Pt meeting malnutrition criteria for severe acute on chronic protein calorie malnutrition.      Nutrition Diagnosis  Malnutrition (Severe) "   related to Impaired cognitive or learning abilities /psychological causes/ Altered Mental Status as evidenced by severe muscle, fat mass loss, poor oral intakes     Nutrition Diagnosis Status: Worsening  Comments: gastrostomy placed 2/2 1436    Nutrition Risk  Level of Risk/Frequency of Follow-up: high   Chewing or Swallowing Difficulty?: gastrostomy placed 2/2 1436    Estimated/Assessed Needs    Temp: 97.8 °F (36.6 °C)Oral  Weight Used For Calorie Calculations: 52.5 kg (115 lb 11.9 oz)   Energy Need Method: Kcal/kg Energy Calorie Requirements (kcal): 4687-3567 (30-35cals/kg)  Weight Used For Protein Calculations: 52.2 kg (115 lb 1.3 oz)  Protein Requirements: 62-78 g PRO (1.2-1.5 g PRO/kg)  Estimated Fluid Requirement Method: RDA Method    RDA Method (mL): 1575     Nutrition Prescription / Recommendations  Recommendation/Intervention: Recommend initiating Jevity 1.5 (or Isosource 1.5 if Jevity 1.5 unavailable)@ 10 ml/hr and hold at 10 ml/hr x 24 hours due to degree of malnutrition and high risk for refeeding. Over initial 24 hours, monitor electrolytes (K, Mg, Phos) and replete to WNL as needed. If after 24 hours, K, Mg, Phos WNL, recommend begin advancing tube feedings by 10 ml q 6-8 hrs to goal rate of 45 ml/hr to provide: 1620 kcal, 68 g PRO, 743 ml free water per tube feeding formula per day. Recommend free water flush of 35 ml/hr, providing 840 ml free water/day. Due to wounds, also recommend addition of Angel(or Arginaid, which can be used in place of Angel if Angel out of stock) BID for additional 180 kcal, 5 g PRO. Total of 1800 kcal, 73 g PRO, 1583 ml free water per day. Keep HOB 30-45 degrees, monitor abdominal pain/distention, n/v/c/d, gastric residuals >500ml.  Goals: tube feeding initiation, weight stability, lab stability  Nutrition Goal Status: new  Current Diet Order: NPO  Nutrition Order Comments: pt had gastrostomy placed 2/2 1436  Recommended Diet: Enteral Nutrition  Recommended Oral  Supplement: No Oral Supplements  Is Nutrition Support Recommended: Yes     Needs Calculated  Energy Need Method: Kcal/kg Energy Calorie Requirements (kcal): 7583-8725 (30-35cals/kg)  Protein Requirements: 62-78 g PRO (1.2-1.5 g PRO/kg)  Enteral Nutrition   Enteral Nutrition Formula Provides:  1620 kcals   68 g Protein  232 g Carbohydrates  53 g Fat   743 ml Fluid without Flush                          840 ml Fluid by flush   1583 ml Total Fluid  Enteral Nutrition Recommended Order:  Tube feeding via NG/ [place] Sump  Tube feeding formula: Jevity 1.5 Continuous 45 ml/h  Free Water Flush: 35 ml hourly  Modular Supplements:Angel 2 times a day  Enteral Nutrition meets needs?: yes  Enteral Nutrition Status: Recommended but Not Ordered  Is Education Recommended: No    Monitor and Evaluation  % current Intake: NPO  % intake to meet estimated needs: Enteral Nutrition   Food and Nutrient Intake: enteral nutrition intake  Food and Nutrient Adminstration: enteral and parenteral nutrition administration  Anthropometric Measurements: weight, weight change, body mass index  Biochemical Data, Medical Tests and Procedures: electrolyte and renal panel, lipid profile, gastrointestinal profile, glucose/endocrine profile, inflammatory profile  Nutrition-Focused Physical Findings: overall appearance, extremities, muscles and bones, head and eyes, skin     Current Medical Diagnosis and Past Medical History  Diagnosis: other (see comments) (failure to thrive in adult)  Past Medical History:   Diagnosis Date    Parkinson's disease      Nutrition/Diet History  Spiritual, Cultural Beliefs, Ep[employer] Practices, Values that Affect Care: no  Food Allergies: NKFA  Factors Affecting Nutritional Intake: NPO    Lab/Procedures/Meds  Recent Labs   Lab 01/31/23  1857 02/01/23  0510 02/02/23  0720 02/03/23  0640   * 154*   < > 144   K 3.9 3.4*   < > 3.2*   BUN 33* 26*   < > 11   CREATININE 0.89 0.89   < > 1.02   GLU 88 90   < > 126*   CALCIUM 9.0  "8.2*   < > 8.2*   ALBUMIN 2.8*  --   --   --    * 117*   < > 108*   ALT 35  --   --   --    AST 38*  --   --   --    PHOS  --  3.1  --   --     < > = values in this interval not displayed.     Last A1c: No results found for: HGBA1C  Lab Results   Component Value Date    RBC 3.66 (L) 02/03/2023    HGB 10.2 (L) 02/03/2023    HCT 33.6 (L) 02/03/2023    MCV 91.8 02/03/2023    MCH 27.9 02/03/2023    MCHC 30.4 (L) 02/03/2023    TIBC 185 (L) 02/01/2023     Pertinent Labs Reviewed: reviewed  Pertinent Labs Comments: Sodium: 154 (H)  Potassium: 3.2 (L)  Chloride: 108 (H) Glucose: 126  Calcium: 8.2 (L) - suspect lab abnormalities r/t poor intakes/malnutrition   Pertinent Medications Reviewed: reviewed  Pertinent Medications Comments: atorvastatin, carbidopa-levodopa, donepezil,  memantine, zosyn, polyethylene glycol, potassium bicarbonate, thiamine/MVI/folic acid, vancomycin, IVF 100ml/hr    Anthropometrics  Temp: 97.8 °F (36.6 °C)  Height Method: Stated  Height: 5' 8" (172.7 cm)  Height (inches): 68 in  Weight Method: Standard Scale  Weight: 52.2 kg (115 lb)  Weight (lb): 115 lb  Ideal Body Weight (IBW), Male: 154 lb  % Ideal Body Weight, Male (lb): 74.68 %  % Ideal Body Weight Malnutrition: 70-79%: moderate deficit  BMI (Calculated): 17.5  BMI Grade: 17 - 18.4 protein-energy malnutrition grade I     Nutrition by Nursing  Diet/Nutrition Received: NPO  Intake (%): 0%     Diet/Feeding Tolerance: unable to swallow  Last Bowel Movement: 01/29/23    Nutrition Follow-Up  RD Follow-up?: Yes  "

## 2023-02-03 NOTE — ASSESSMENT & PLAN NOTE
Evaluate in the OR to see if bone is exposed, with debridement if indicated    02/03/2023   Bone fragment isolated staph aureus and Gram-negative bacilli on preliminary culture.  Vanc and Zosyn  Dressing change with wet-to-dry

## 2023-02-04 LAB
BUN SERPL-MCNC: 8 MG/DL (ref 7–18)
BUN/CREAT SERPL: 9 (ref 6–20)
CREAT SERPL-MCNC: 0.93 MG/DL (ref 0.7–1.3)
EGFR (NO RACE VARIABLE) (RUSH/TITUS): 78 ML/MIN/1.73M²
VANCOMYCIN TROUGH SERPL-MCNC: 12.9 ΜG/ML (ref 10–20)

## 2023-02-04 PROCEDURE — 80202 ASSAY OF VANCOMYCIN: CPT | Performed by: HOSPITALIST

## 2023-02-04 PROCEDURE — 82565 ASSAY OF CREATININE: CPT | Performed by: HOSPITALIST

## 2023-02-04 PROCEDURE — 99232 SBSQ HOSP IP/OBS MODERATE 35: CPT | Mod: ,,, | Performed by: HOSPITALIST

## 2023-02-04 PROCEDURE — 25000003 PHARM REV CODE 250

## 2023-02-04 PROCEDURE — 25000003 PHARM REV CODE 250: Performed by: HOSPITALIST

## 2023-02-04 PROCEDURE — 94761 N-INVAS EAR/PLS OXIMETRY MLT: CPT

## 2023-02-04 PROCEDURE — 99232 PR SUBSEQUENT HOSPITAL CARE,LEVL II: ICD-10-PCS | Mod: ,,, | Performed by: HOSPITALIST

## 2023-02-04 PROCEDURE — 63600175 PHARM REV CODE 636 W HCPCS

## 2023-02-04 PROCEDURE — 63600175 PHARM REV CODE 636 W HCPCS: Performed by: HOSPITALIST

## 2023-02-04 PROCEDURE — 84520 ASSAY OF UREA NITROGEN: CPT | Performed by: HOSPITALIST

## 2023-02-04 PROCEDURE — S5010 5% DEXTROSE AND 0.45% SALINE: HCPCS | Performed by: HOSPITALIST

## 2023-02-04 PROCEDURE — 11000001 HC ACUTE MED/SURG PRIVATE ROOM

## 2023-02-04 RX ADMIN — PIPERACILLIN AND TAZOBACTAM 4.5 G: 4; .5 INJECTION, POWDER, FOR SOLUTION INTRAVENOUS; PARENTERAL at 06:02

## 2023-02-04 RX ADMIN — MUPIROCIN: 20 OINTMENT TOPICAL at 08:02

## 2023-02-04 RX ADMIN — CARBIDOPA AND LEVODOPA 1 TABLET: 25; 100 TABLET ORAL at 08:02

## 2023-02-04 RX ADMIN — PIPERACILLIN AND TAZOBACTAM 4.5 G: 4; .5 INJECTION, POWDER, FOR SOLUTION INTRAVENOUS; PARENTERAL at 09:02

## 2023-02-04 RX ADMIN — DONEPEZIL HYDROCHLORIDE 10 MG: 5 TABLET, FILM COATED ORAL at 08:02

## 2023-02-04 RX ADMIN — VANCOMYCIN HYDROCHLORIDE 1000 MG: 1 INJECTION, POWDER, LYOPHILIZED, FOR SOLUTION INTRAVENOUS at 01:02

## 2023-02-04 RX ADMIN — MEMANTINE 10 MG: 10 TABLET ORAL at 08:02

## 2023-02-04 RX ADMIN — VANCOMYCIN HYDROCHLORIDE 1000 MG: 1 INJECTION, POWDER, LYOPHILIZED, FOR SOLUTION INTRAVENOUS at 08:02

## 2023-02-04 RX ADMIN — PIPERACILLIN AND TAZOBACTAM 4.5 G: 4; .5 INJECTION, POWDER, FOR SOLUTION INTRAVENOUS; PARENTERAL at 03:02

## 2023-02-04 RX ADMIN — POLYETHYLENE GLYCOL 3350 17 G: 17 POWDER, FOR SOLUTION ORAL at 08:02

## 2023-02-04 RX ADMIN — POTASSIUM BICARBONATE 20 MEQ: 782 TABLET, EFFERVESCENT ORAL at 10:02

## 2023-02-04 RX ADMIN — ATORVASTATIN CALCIUM 40 MG: 40 TABLET, FILM COATED ORAL at 08:02

## 2023-02-04 NOTE — PLAN OF CARE
Problem: Adult Inpatient Plan of Care  Goal: Plan of Care Review  Outcome: Ongoing, Progressing  Flowsheets (Taken 2/3/2023 1803)  Plan of Care Reviewed With:   patient   caregiver  Goal: Patient-Specific Goal (Individualized)  Outcome: Ongoing, Progressing  Flowsheets (Taken 2/3/2023 1803)  Anxieties, Fears or Concerns: Going home  Individualized Care Needs: Wound care  Goal: Absence of Hospital-Acquired Illness or Injury  Outcome: Ongoing, Progressing  Intervention: Prevent Skin Injury  Flowsheets (Taken 2/3/2023 1803)  Skin Protection:   adhesive use limited   incontinence pads utilized  Intervention: Prevent and Manage VTE (Venous Thromboembolism) Risk  Flowsheets (Taken 2/3/2023 1803)  Range of Motion: active ROM (range of motion) encouraged  Intervention: Prevent Infection  Flowsheets (Taken 2/3/2023 1803)  Infection Prevention:   rest/sleep promoted   single patient room provided   hand hygiene promoted  Goal: Optimal Comfort and Wellbeing  Outcome: Ongoing, Progressing  Intervention: Monitor Pain and Promote Comfort  Flowsheets (Taken 2/3/2023 1803)  Pain Management Interventions:   pain management plan reviewed with patient/caregiver   quiet environment facilitated   relaxation techniques promoted  Intervention: Provide Person-Centered Care  Flowsheets (Taken 2/3/2023 1803)  Trust Relationship/Rapport:   care explained   choices provided   emotional support provided   empathic listening provided   thoughts/feelings acknowledged   reassurance provided   questions encouraged   questions answered  Goal: Readiness for Transition of Care  Outcome: Ongoing, Progressing     Problem: Impaired Wound Healing  Goal: Optimal Wound Healing  Outcome: Ongoing, Progressing  Intervention: Promote Wound Healing  Flowsheets (Taken 2/3/2023 1803)  Sleep/Rest Enhancement:   awakenings minimized   regular sleep/rest pattern promoted  Pain Management Interventions:   pain management plan reviewed with patient/caregiver   quiet  environment facilitated   relaxation techniques promoted     Problem: Skin Injury Risk Increased  Goal: Skin Health and Integrity  Outcome: Ongoing, Progressing  Intervention: Optimize Skin Protection  Flowsheets (Taken 2/3/2023 1803)  Pressure Reduction Techniques:   frequent weight shift encouraged   heels elevated off bed   positioned off wounds  Pressure Reduction Devices: foam padding utilized  Skin Protection:   adhesive use limited   incontinence pads utilized     Problem: Fall Injury Risk  Goal: Absence of Fall and Fall-Related Injury  Outcome: Ongoing, Progressing  Intervention: Identify and Manage Contributors  Flowsheets (Taken 2/3/2023 1803)  Medication Review/Management: medications reviewed   Plan of care reviewed. Patient progressing

## 2023-02-04 NOTE — NURSING
Pt's daughter at bedside. I have informed her that the pt will be starting on tube feedings today and that he will have to remain sitting up at a min of 30 degrees at all times. Pt has a gauze in his left ear with a small amount of blood on it. Daughter stated that she caused the bleeding when she was trying to clean out his ears. Pt has a small skin tear to ear.

## 2023-02-04 NOTE — PROGRESS NOTES
[facility]  [facility] Medicine  Progress Note    Patient Name: Juliet Arboleda  MRN: [id number]  Patient Class: IP- Inpatient   Admission Date: 1/31/2023  Length of Stay: 3 days  Attending Physician: Jerrod Almanza MD  Primary Care Provider: Primary Doctor No        Subjective:     Principal Problem:Failure to thrive in adult        HPI:  89 year old male was brought to the ED at [facility] due to weakness and foul smelling wound of the right foot. Patient is non verbal, has dementia and is bed bound therefor the Hx is provided by the daughter who is the care giver.  Patient started having mucus built up and spitting up at home 4-5 days ago, requiring suction. He started having weakness, decrease appetite during this time and skipping meals until for the past 2 days he stopped eating completely therefore the daughter decided to bring him into the ED. Daughter denies any vomiting, tenderness in the abdomen, diarrhea, abnormalities with the urine but reports patient is not at his baseline and is less responsive and more confuse than usual.    Patient has 4 wounds, one sacral wound and one on the left hip/left buttock that are not draining and do not look infectious. He also has 2 wounds on the left foot. One wound is on the dorsal surface of the foot and is not draining but the wound at the base of the 5th metatarsal/lateral aspect of the foot is draining purulent fluid, is tender to touch and is foul smelling. Daughter reports patient receives wound care at [facility] and has received IV antibiotics in the past because of the foot wounds.      Patient was at a normal level of functioning until 2016 when he was diagnosed with Parkinson and then started having dementia gradually. Patient was able to ambulate until 2018 when he had a huge decline and a fall then he became bed bound. He currently is non verbal at home. Patient has chronic constipation and uses enema every 6 days. 2 days ago was the  last time enema was used and BM was very small in volume. Daughter reports they have been considering a PEG tube for the patient and have been in touch with a surgeon in [place] regarding PEG.    ED course:   Xray of the left foot: Erosion at the base of the 5th metatarsal may reflect osteomyelitis.  However, no comparisons available to determine the chronicity of this finding.  Sodium was 154 with Cl 116.   Cr GFR and liver enzymes wnl  Troponin, BNP normal  WBC is not elevated but H/H os 10.2/34.8        Overview/Hospital Course:  02/02 To get PEG. Pt awake. Very cachectic. Start feeding afterwards. To swb. Supportive care.  02/03 Had to have open gastrectomy. Feeding to start slowly in am. Monitor. Friend in room.      Interval History:     Review of Systems   Constitutional:  Positive for activity change, appetite change and fatigue. Negative for fever.   HENT:  Positive for trouble swallowing. Negative for congestion and hearing loss.    Respiratory:  Negative for chest tightness, shortness of breath and wheezing.    Cardiovascular:  Negative for chest pain and palpitations.   Gastrointestinal:  Negative for abdominal pain, constipation and nausea.   Genitourinary:  Negative for difficulty urinating and dysuria.   Musculoskeletal:  Negative for back pain and neck stiffness.   Skin:  Negative for pallor and rash.   Neurological:  Positive for speech difficulty. Negative for dizziness and headaches.   Psychiatric/Behavioral:  Positive for confusion. Negative for suicidal ideas.    Objective:     Vital Signs (Most Recent):  Temp: 96.8 °F (36 °C) (02/03/23 2033)  Pulse: (!) 59 (02/03/23 2033)  Resp: 20 (02/03/23 2033)  BP: 100/63 (02/03/23 2033)  SpO2: 99 % (02/03/23 2033)   Vital Signs (24h Range):  Temp:  [96.8 °F (36 °C)-98.1 °F (36.7 °C)] 96.8 °F (36 °C)  Pulse:  [59-98] 59  Resp:  [17-21] 20  SpO2:  [96 %-100 %] 99 %  BP: ()/(51-82) 100/63     Weight: 52.2 kg (115 lb)  Body mass index is 17.49  kg/m².    Intake/Output Summary (Last 24 hours) at 2/3/2023 2217  Last data filed at 2/3/2023 1850  Gross per 24 hour   Intake 706.25 ml   Output --   Net 706.25 ml        Physical Exam  Vitals reviewed.   Constitutional:       General: He is awake. He is not in acute distress.     Appearance: He is well-developed. He is cachectic. He is not toxic-appearing.   HENT:      Head: Normocephalic.      Nose: Nose normal.      Mouth/Throat:      Pharynx: Oropharynx is clear.   Eyes:      Extraocular Movements: Extraocular movements intact.      Pupils: Pupils are equal, round, and reactive to light.   Neck:      Thyroid: No thyroid mass.      Vascular: No carotid bruit.   Cardiovascular:      Rate and Rhythm: Normal rate and regular rhythm.      Pulses: Normal pulses.      Heart sounds: Normal heart sounds. No murmur heard.  Pulmonary:      Effort: Pulmonary effort is normal.      Breath sounds: Normal breath sounds and air entry. No wheezing.   Abdominal:      General: Bowel sounds are normal. There is no distension.      Palpations: Abdomen is soft.      Tenderness: There is no abdominal tenderness.   Musculoskeletal:         General: Normal range of motion.      Cervical back: Neck supple. No rigidity.   Skin:     General: Skin is warm.      Coloration: Skin is not jaundiced.      Findings: No lesion.   Neurological:      General: No focal deficit present.      Mental Status: He is alert. He is disoriented.      Cranial Nerves: No cranial nerve deficit.   Psychiatric:         Attention and Perception: Attention normal.         Behavior: Behavior is cooperative.         Cognition and Memory: Cognition normal.      Comments: Not talking or following commands       Significant Labs: All pertinent labs within the past 24 hours have been reviewed.  BMP:   Recent Labs   Lab 02/02/23  0720 02/03/23  0640    126*   * 144   K 3.1* 3.2*   * 108*   CO2 30 30   BUN 14 11   CREATININE 0.89 1.02   CALCIUM 8.0* 8.2*    MG 2.1  --        CBC:   Recent Labs   Lab 02/03/23  0640   WBC 4.00*   HGB 10.2*   HCT 33.6*   *       CMP:   Recent Labs   Lab 02/02/23  0720 02/03/23  0640   * 144   K 3.1* 3.2*   * 108*   CO2 30 30    126*   BUN 14 11   CREATININE 0.89 1.02   CALCIUM 8.0* 8.2*   ANIONGAP 10 9         Significant Imaging: I have reviewed all pertinent imaging results/findings within the past 24 hours.      Assessment/Plan:      * Failure to thrive in adult    Cachectic state to start enteral feeding per peg    Anemia of chronic disease    H/H 10.2/34.8 with MCV if 91.6  [facility]  Iron studies, B12 and folate are pending to r/o other causes of anemia    HLD (hyperlipidemia)    Home Crestor was changed to lipitor 40 mg daily     Dementia associated with Parkinson's disease    Continue home meds carbidopa-levodopa, donepezol and memantine    Cachexia    BMI of 17.49  Patient is unable to feed himself and daughter is the caregiver   Patient can only have Puree food.   Family has considered PEG tube and had an appointment today at [facility] to consult a surgeon regarding PEG- could not go to the appointment since they came to the ED  Patient has not ate anything in the last 2 days, has had decrease oral intake in the last 5 days  D5 half normal saline with rate of 75 cc/hr  refeeding syndrome precautions - high dose Thiamine IV ordered daily  Phosphate levels pending      Osteomyelitis of left foot    Xray: Erosion at the base of the 5th metatarsal may reflect osteomyelitis.  However, no comparisons available to determine the chronicity of this finding.  Wound culture of the latera wound at the base of the 5th metatarsal is pending  BC pending  Vanc and zosyn  Fluid maintenance   Surgery consulted  Wound care consulted   NPO  ESR, CRP, Lactic acid, Chest xray pending and UA is pending       VTE Risk Mitigation (From admission, onward)         Ordered     IP VTE HIGH RISK PATIENT   Once         01/31/23 2319     Place sequential compression device  Until discontinued         01/31/23 2319     Reason for No Pharmacological VTE Prophylaxis  Once        Question:  Reasons:  Answer:  Physician Provided (leave comment)  Comment:  possible surgery tomorrow    01/31/23 2319                Discharge Planning   ROB: 2/3/2023     Code Status: Full Code   Is the patient medically ready for discharge?:     Reason for patient still in hospital (select all that apply): Patient trending condition, Laboratory test, Treatment, Imaging and Consult recommendations  Discharge Plan A: Home Health                  Jerrod Almanza MD  Department of Hospital Medicine   [facility]

## 2023-02-04 NOTE — PROGRESS NOTES
"[facility]  Adult Nutrition  Follow-up Note         Reason for Assessment  Reason For Assessment: RD follow-up  Nutrition Risk Screen: tube feeding or parenteral nutrition    RD follow up. RD secure chat attending MD regarding tube feeding recommendation; RD awaiting MD approval before entering order.     Recommend initiating Jevity 1.5 (or Isosource 1.5 if Jevity 1.5 unavailable)@ 10 ml/hr and hold at 10 ml/hr x 24 hours due to degree of malnutrition and high risk for refeeding. Over initial 24 hours, monitor electrolytes (K, Mg, Phos) and replete to WNL as needed. If after 24 hours, K, Mg, Phos WNL, recommend begin advancing tube feedings by 10 ml q 6-8 hrs to goal rate of 45 ml/hr to provide: 1620 kcal, 68 g PRO, 743 ml free water per tube feeding formula per day. Recommend free water flush of 35 ml/hr, providing 840 ml free water/day. Due to wounds, also recommend addition of Angel(or Arginaid, which can be used in place of Angel if Angel out of stock) BID for additional 180 kcal, 5 g PRO. Total of 1800 kcal, 73 g PRO, 1583 ml free water per day.     Keep HOB 30-45 degrees, monitor abdominal pain/distention, n/v/c/d, gastric residuals >500ml.    Per MD note,  "02/02 To get PEG. Pt awake. Very cachectic. Start feeding afterwards. To swb. Supportive care.  02/03 Had to have open gastrectomy. Feeding to start slowly in am. Monitor. Friend in room."    Per 2/02 surgery note:  "Description of the Findings of the Procedure:       Percutaneous gastrostomy could not be performed, due to patient with contracted rectus abdominis muscle that would not allow transillumination or visualization of the external palpation.    There were no unusual findings on EGD, revealing no ulcerations, esophagitis or abnormal masses in the esophagus or stomach.       An open gastrostomy was performed using Ke gastrostomy technique.    The left lateral foot wound was debrided with a rongeur, excising bone which was " "cultured"      Recommend initiating Jevity 1.5 (or Isosource 1.5 if Jevity 1.5 unavailable)@ 10 ml/hr and hold at 10 ml/hr x 24 hours due to degree of malnutrition and high risk for refeeding. Over initial 24 hours, monitor electrolytes (K, Mg, Phos) and replete to WNL as needed. If after 24 hours, K, Mg, Phos WNL, recommend begin advancing tube feedings by 10 ml q 6-8 hrs to goal rate of 45 ml/hr to provide: 1620 kcal, 68 g PRO, 743 ml free water per tube feeding formula per day. Recommend free water flush of 35 ml/hr, providing 840 ml free water/day. Due to wounds, also recommend addition of Angel(or Arginaid, which can be used in place of Angel if Angel out of stock) BID for additional 180 kcal, 5 g PRO. Total of 1800 kcal, 73 g PRO, 1583 ml free water per day.     Last BM 1/29 per flowsheets - pt receiving polyethylene glycol per med list. Will continue to monitor tube feeding initiation/adequacy/tolerance, labs, meds, weights, updates in patient condition. RD following.     Malnutrition  Is Patient Malnourished: Yes     Malnutrition Assessment  Malnutrition Type: acute illness or injury  Energy Intake: severe energy intake  Skin (Micronutrient): dry, pallor, thinned, wounds unhealed  Gums (Micronutrient): inflamed, red  Neck/Chest (Micronutrient): muscle wasting, bony prominence, neck veins distended, subcutaneous fat loss  Musculoskeletal/Lower Extremities: muscle wasting, subcutaneous fat loss       Energy Intake (Malnutrition): less than or equal to 50% for greater than or equal to 1 month  Subcutaneous Fat (Malnutrition): severe depletion  Muscle Mass (Malnutrition): severe depletion   Orbital Region (Subcutaneous Fat Loss): severe depletion  Upper Arm Region (Subcutaneous Fat Loss): severe depletion  Thoracic and Lumbar Region: severe depletion   Cheondoism Region (Muscle Loss): severe depletion  Clavicle Bone Region (Muscle Loss): severe depletion  Clavicle and Acromion Bone Region (Muscle Loss): severe " "depletion  Scapular Bone Region (Muscle Loss): severe depletion  Dorsal Hand (Muscle Loss): severe depletion  Patellar Region (Muscle Loss): severe depletion  Anterior Thigh Region (Muscle Loss): severe depletion  Posterior Calf Region (Muscle Loss): severe depletion       Subcutaneous Fat Loss (Final Summary): severe protein-calorie malnutrition  Muscle Loss Evaluation (Final Summary): severe protein-calorie malnutrition      RD consult received 2/01. Pt with MST score of 3. Pt seen 2/01 for nutrition assessment. Spoke with pt daughter at bedside, pt did not arouse during NFPE. Daughter reports he has been eating poorly since she beileves Nov/Dec, though degree of malnutrition appears his oral intakes have likely been poor for a prolonged period of time. Pt with severe muscle and fat mass loss present, bones easily palpated on NFPE. Daughter reports he only consumes a puree diet but that recently he has been eating less and less, typically eating something every other day. Reports at one time she was noticing his gums were red, inflamed, and bleeding and she thought this could be contributing to his decreased intakes. Stated she provided oral care and this helped him consume more 1-2 days but then his intakes declined again. Reports he does not take any sort of daily supplement (Ensure, Boost, etc.) or vitamin supplement. Was providing him smoothies/baby food. She reports SLP evaluation with home health, but was unable to report their findings/recommendations, stating they just said his mental status was declined. Note PEG discussion in H&P.       She reports she has a home suction device and was having to suction him more frequently, noting increased phlegm that she thinks was from a smoothie she was giving him. She also notes episodes where he was "chewing" on food in his mouth for prolonged periods of time, seems consistent with pocketing. Pt meeting malnutrition criteria for severe acute on chronic protein " calorie malnutrition.      Nutrition Diagnosis  Malnutrition (Severe)   related to Impaired cognitive or learning abilities /psychological causes/ Altered Mental Status as evidenced by severe muscle, fat mass loss, poor oral intakes     Nutrition Diagnosis Status: Worsening  Comments: gastrostomy placed 2/2 1436    Nutrition Risk  Level of Risk/Frequency of Follow-up: high   Chewing or Swallowing Difficulty?: gastrostomy placed 2/2 1436    Estimated/Assessed Needs    Temp: 97.5 °F (36.4 °C)Oral  Weight Used For Calorie Calculations: 52.5 kg (115 lb 11.9 oz)   Energy Need Method: Kcal/kg Energy Calorie Requirements (kcal): 0920-4212 (30-35cals/kg)  Weight Used For Protein Calculations: 52.2 kg (115 lb 1.3 oz)  Protein Requirements: 62-78 g PRO (1.2-1.5 g PRO/kg)  Estimated Fluid Requirement Method: RDA Method    RDA Method (mL): 1575     Nutrition Prescription / Recommendations  Recommendation/Intervention: Recommend initiating Jevity 1.5 (or Isosource 1.5 if Jevity 1.5 unavailable)@ 10 ml/hr and hold at 10 ml/hr x 24 hours due to degree of malnutrition and high risk for refeeding. Over initial 24 hours, monitor electrolytes (K, Mg, Phos) and replete to WNL as needed. If after 24 hours, K, Mg, Phos WNL, recommend begin advancing tube feedings by 10 ml q 6-8 hrs to goal rate of 45 ml/hr to provide: 1620 kcal, 68 g PRO, 743 ml free water per tube feeding formula per day. Recommend free water flush of 35 ml/hr, providing 840 ml free water/day. Due to wounds, also recommend addition of Angel(or Arginaid, which can be used in place of Angel if Angel out of stock) BID for additional 180 kcal, 5 g PRO. Total of 1800 kcal, 73 g PRO, 1583 ml free water per day. Keep HOB 30-45 degrees, monitor abdominal pain/distention, n/v/c/d, gastric residuals >500ml.  Goals: tube feeding initiation, weight stability, lab stability  Nutrition Goal Status: new  Current Diet Order: NPO  Nutrition Order Comments: pt had gastrostomy placed 2/2  1436  Recommended Diet: Enteral Nutrition  Recommended Oral Supplement: No Oral Supplements  Is Nutrition Support Recommended: Yes     Needs Calculated  Energy Need Method: Kcal/kg Energy Calorie Requirements (kcal): 4657-8577 (30-35cals/kg)  Protein Requirements: 62-78 g PRO (1.2-1.5 g PRO/kg)  Enteral Nutrition   Enteral Nutrition Formula Provides:  1620 kcals   68 g Protein  232 g Carbohydrates  53 g Fat   743 ml Fluid without Flush                          840 ml Fluid by flush   1583 ml Total Fluid  Enteral Nutrition Recommended Order:  Tube feeding via NG/ Opal Sump  Tube feeding formula: Jevity 1.5 Continuous 45 ml/h  Free Water Flush: 35 ml hourly  Modular Supplements:Angel 2 times a day  Enteral Nutrition meets needs?: yes  Enteral Nutrition Status: Recommended but Not Ordered  Is Education Recommended: No    Monitor and Evaluation  % current Intake: NPO  % intake to meet estimated needs: Enteral Nutrition   Food and Nutrient Intake: enteral nutrition intake  Food and Nutrient Adminstration: enteral and parenteral nutrition administration  Anthropometric Measurements: weight, weight change, body mass index  Biochemical Data, Medical Tests and Procedures: electrolyte and renal panel, lipid profile, gastrointestinal profile, glucose/endocrine profile, inflammatory profile  Nutrition-Focused Physical Findings: overall appearance, extremities, muscles and bones, head and eyes, skin     Current Medical Diagnosis and Past Medical History  Diagnosis: other (see comments) (failure to thrive in adult)  Past Medical History:   Diagnosis Date    Parkinson's disease      Nutrition/Diet History  Spiritual, Cultural Beliefs, Mosque Practices, Values that Affect Care: no  Food Allergies: NKFA  Factors Affecting Nutritional Intake: NPO    Lab/Procedures/Meds  Recent Labs   Lab 02/03/23  0640 02/04/23  0004     --    K 3.2*  --    BUN 11 8   CREATININE 1.02 0.93   *  --    CALCIUM 8.2*  --    *  --   "      Last A1c: No results found for: HGBA1C  Lab Results   Component Value Date    RBC 3.66 (L) 02/03/2023    HGB 10.2 (L) 02/03/2023    HCT 33.6 (L) 02/03/2023    MCV 91.8 02/03/2023    MCH 27.9 02/03/2023    MCHC 30.4 (L) 02/03/2023    TIBC 185 (L) 02/01/2023     Pertinent Labs Reviewed: reviewed  Pertinent Medications Reviewed: reviewed  Pertinent Medications Comments: atorvastatin, carbidopa-levodopa, donepezil,  memantine, zosyn, polyethylene glycol, potassium bicarbonate, thiamine/MVI/folic acid, vancomycin, IVF 100ml/hr    Anthropometrics  Temp: 97.5 °F (36.4 °C)  Height Method: Stated  Height: 5' 8" (172.7 cm)  Height (inches): 68 in  Weight Method: Standard Scale  Weight: 52.2 kg (115 lb)  Weight (lb): 115 lb  Ideal Body Weight (IBW), Male: 154 lb  % Ideal Body Weight, Male (lb): 74.68 %  % Ideal Body Weight Malnutrition: 70-79%: moderate deficit  BMI (Calculated): 17.5  BMI Grade: 17 - 18.4 protein-energy malnutrition grade I     Nutrition by Nursing  Diet/Nutrition Received: NPO  Intake (%): 0%     Diet/Feeding Tolerance: unable to swallow  Last Bowel Movement: 01/29/23    Nutrition Follow-Up  RD Follow-up?: Yes  "

## 2023-02-04 NOTE — SUBJECTIVE & OBJECTIVE
Interval History:     Review of Systems   Constitutional:  Positive for activity change, appetite change and fatigue. Negative for fever.   HENT:  Positive for trouble swallowing. Negative for congestion and hearing loss.    Respiratory:  Negative for chest tightness, shortness of breath and wheezing.    Cardiovascular:  Negative for chest pain and palpitations.   Gastrointestinal:  Negative for abdominal pain, constipation and nausea.   Genitourinary:  Negative for difficulty urinating and dysuria.   Musculoskeletal:  Negative for back pain and neck stiffness.   Skin:  Negative for pallor and rash.   Neurological:  Positive for speech difficulty. Negative for dizziness and headaches.   Psychiatric/Behavioral:  Positive for confusion. Negative for suicidal ideas.    Objective:     Vital Signs (Most Recent):  Temp: 96.8 °F (36 °C) (02/03/23 2033)  Pulse: (!) 59 (02/03/23 2033)  Resp: 20 (02/03/23 2033)  BP: 100/63 (02/03/23 2033)  SpO2: 99 % (02/03/23 2033)   Vital Signs (24h Range):  Temp:  [96.8 °F (36 °C)-98.1 °F (36.7 °C)] 96.8 °F (36 °C)  Pulse:  [59-98] 59  Resp:  [17-21] 20  SpO2:  [96 %-100 %] 99 %  BP: ()/(51-82) 100/63     Weight: 52.2 kg (115 lb)  Body mass index is 17.49 kg/m².    Intake/Output Summary (Last 24 hours) at 2/3/2023 2217  Last data filed at 2/3/2023 1850  Gross per 24 hour   Intake 706.25 ml   Output --   Net 706.25 ml        Physical Exam  Vitals reviewed.   Constitutional:       General: He is awake. He is not in acute distress.     Appearance: He is well-developed. He is cachectic. He is not toxic-appearing.   HENT:      Head: Normocephalic.      Nose: Nose normal.      Mouth/Throat:      Pharynx: Oropharynx is clear.   Eyes:      Extraocular Movements: Extraocular movements intact.      Pupils: Pupils are equal, round, and reactive to light.   Neck:      Thyroid: No thyroid mass.      Vascular: No carotid bruit.   Cardiovascular:      Rate and Rhythm: Normal rate and regular  rhythm.      Pulses: Normal pulses.      Heart sounds: Normal heart sounds. No murmur heard.  Pulmonary:      Effort: Pulmonary effort is normal.      Breath sounds: Normal breath sounds and air entry. No wheezing.   Abdominal:      General: Bowel sounds are normal. There is no distension.      Palpations: Abdomen is soft.      Tenderness: There is no abdominal tenderness.   Musculoskeletal:         General: Normal range of motion.      Cervical back: Neck supple. No rigidity.   Skin:     General: Skin is warm.      Coloration: Skin is not jaundiced.      Findings: No lesion.   Neurological:      General: No focal deficit present.      Mental Status: He is alert. He is disoriented.      Cranial Nerves: No cranial nerve deficit.   Psychiatric:         Attention and Perception: Attention normal.         Behavior: Behavior is cooperative.         Cognition and Memory: Cognition normal.      Comments: Not talking or following commands       Significant Labs: All pertinent labs within the past 24 hours have been reviewed.  BMP:   Recent Labs   Lab 02/02/23  0720 02/03/23  0640    126*   * 144   K 3.1* 3.2*   * 108*   CO2 30 30   BUN 14 11   CREATININE 0.89 1.02   CALCIUM 8.0* 8.2*   MG 2.1  --        CBC:   Recent Labs   Lab 02/03/23  0640   WBC 4.00*   HGB 10.2*   HCT 33.6*   *       CMP:   Recent Labs   Lab 02/02/23  0720 02/03/23  0640   * 144   K 3.1* 3.2*   * 108*   CO2 30 30    126*   BUN 14 11   CREATININE 0.89 1.02   CALCIUM 8.0* 8.2*   ANIONGAP 10 9         Significant Imaging: I have reviewed all pertinent imaging results/findings within the past 24 hours.   Enbrel Counseling:  I discussed with the patient the risks of etanercept including but not limited to myelosuppression, immunosuppression, autoimmune hepatitis, demyelinating diseases, lymphoma, and infections.  The patient understands that monitoring is required including a PPD at baseline and must alert us or the primary physician if symptoms of infection or other concerning signs are noted.

## 2023-02-04 NOTE — PROGRESS NOTES
Pharmacy managing Vancomycin therapy.     Trough of 12.9 slightly below desired range of 15-20 based on nature of patient's infection.     Will continue Vancomycin 1000 mg IV q 24 hrs with time adjustment to 2000 daily with next dose at 2000 later today in effort to achieve a slightly higher trough level.     Trough to be checked again prior 2/6 PM dose.     Pharmacy to follow daily.    Thank you.

## 2023-02-05 LAB
CULTURE, BONE: ABNORMAL
MICROORGANISM SPEC CULT: ABNORMAL

## 2023-02-05 PROCEDURE — 11000001 HC ACUTE MED/SURG PRIVATE ROOM

## 2023-02-05 PROCEDURE — 63600175 PHARM REV CODE 636 W HCPCS: Performed by: HOSPITALIST

## 2023-02-05 PROCEDURE — 99232 PR SUBSEQUENT HOSPITAL CARE,LEVL II: ICD-10-PCS | Mod: ,,, | Performed by: HOSPITALIST

## 2023-02-05 PROCEDURE — 25000003 PHARM REV CODE 250

## 2023-02-05 PROCEDURE — 94761 N-INVAS EAR/PLS OXIMETRY MLT: CPT

## 2023-02-05 PROCEDURE — 25000003 PHARM REV CODE 250: Performed by: HOSPITALIST

## 2023-02-05 PROCEDURE — 63600175 PHARM REV CODE 636 W HCPCS

## 2023-02-05 PROCEDURE — 99232 SBSQ HOSP IP/OBS MODERATE 35: CPT | Mod: ,,, | Performed by: HOSPITALIST

## 2023-02-05 RX ORDER — ATORVASTATIN CALCIUM 40 MG/1
40 TABLET, FILM COATED ORAL DAILY
Status: DISCONTINUED | OUTPATIENT
Start: 2023-02-06 | End: 2023-02-07 | Stop reason: HOSPADM

## 2023-02-05 RX ORDER — LACTOBACILLUS ACIDOPHILUS 500MM CELL
2 CAPSULE ORAL
Status: DISCONTINUED | OUTPATIENT
Start: 2023-02-06 | End: 2023-02-05

## 2023-02-05 RX ORDER — POLYETHYLENE GLYCOL 3350 17 G/17G
17 POWDER, FOR SOLUTION ORAL DAILY
Status: DISCONTINUED | OUTPATIENT
Start: 2023-02-06 | End: 2023-02-07 | Stop reason: HOSPADM

## 2023-02-05 RX ORDER — MEMANTINE HYDROCHLORIDE 10 MG/1
10 TABLET ORAL DAILY
Status: DISCONTINUED | OUTPATIENT
Start: 2023-02-06 | End: 2023-02-07 | Stop reason: HOSPADM

## 2023-02-05 RX ORDER — CARBIDOPA AND LEVODOPA 25; 100 MG/1; MG/1
1 TABLET ORAL DAILY
Status: DISCONTINUED | OUTPATIENT
Start: 2023-02-06 | End: 2023-02-07 | Stop reason: HOSPADM

## 2023-02-05 RX ORDER — DONEPEZIL HYDROCHLORIDE 5 MG/1
10 TABLET, FILM COATED ORAL DAILY
Status: DISCONTINUED | OUTPATIENT
Start: 2023-02-06 | End: 2023-02-07 | Stop reason: HOSPADM

## 2023-02-05 RX ORDER — LACTOBACILLUS ACIDOPHILUS 500MM CELL
2 CAPSULE ORAL
Status: DISCONTINUED | OUTPATIENT
Start: 2023-02-06 | End: 2023-02-07 | Stop reason: HOSPADM

## 2023-02-05 RX ADMIN — PIPERACILLIN AND TAZOBACTAM 4.5 G: 4; .5 INJECTION, POWDER, FOR SOLUTION INTRAVENOUS; PARENTERAL at 05:02

## 2023-02-05 RX ADMIN — MEMANTINE 10 MG: 10 TABLET ORAL at 10:02

## 2023-02-05 RX ADMIN — DONEPEZIL HYDROCHLORIDE 10 MG: 5 TABLET, FILM COATED ORAL at 10:02

## 2023-02-05 RX ADMIN — POTASSIUM BICARBONATE 20 MEQ: 782 TABLET, EFFERVESCENT ORAL at 10:02

## 2023-02-05 RX ADMIN — MUPIROCIN: 20 OINTMENT TOPICAL at 10:02

## 2023-02-05 RX ADMIN — CARBIDOPA AND LEVODOPA 1 TABLET: 25; 100 TABLET ORAL at 10:02

## 2023-02-05 RX ADMIN — PIPERACILLIN AND TAZOBACTAM 4.5 G: 4; .5 INJECTION, POWDER, FOR SOLUTION INTRAVENOUS; PARENTERAL at 02:02

## 2023-02-05 RX ADMIN — FOLIC ACID: 5 INJECTION, SOLUTION INTRAMUSCULAR; INTRAVENOUS; SUBCUTANEOUS at 11:02

## 2023-02-05 RX ADMIN — ATORVASTATIN CALCIUM 40 MG: 40 TABLET, FILM COATED ORAL at 10:02

## 2023-02-05 RX ADMIN — PIPERACILLIN AND TAZOBACTAM 4.5 G: 4; .5 INJECTION, POWDER, FOR SOLUTION INTRAVENOUS; PARENTERAL at 10:02

## 2023-02-05 RX ADMIN — POTASSIUM BICARBONATE 20 MEQ: 782 TABLET, EFFERVESCENT ORAL at 04:02

## 2023-02-05 RX ADMIN — VANCOMYCIN HYDROCHLORIDE 1000 MG: 1 INJECTION, POWDER, LYOPHILIZED, FOR SOLUTION INTRAVENOUS at 10:02

## 2023-02-05 NOTE — PROGRESS NOTES
[facility]  [facility] Medicine  Progress Note    Patient Name: Juliet Arboleda  MRN: [id number]  Patient Class: IP- Inpatient   Admission Date: 1/31/2023  Length of Stay: 4 days  Attending Physician: Jerrod Almanza MD  Primary Care Provider: Primary Doctor No        Subjective:     Principal Problem:Failure to thrive in adult        HPI:  89 year old male was brought to the ED at [facility] due to weakness and foul smelling wound of the right foot. Patient is non verbal, has dementia and is bed bound therefor the Hx is provided by the daughter who is the care giver.  Patient started having mucus built up and spitting up at home 4-5 days ago, requiring suction. He started having weakness, decrease appetite during this time and skipping meals until for the past 2 days he stopped eating completely therefore the daughter decided to bring him into the ED. Daughter denies any vomiting, tenderness in the abdomen, diarrhea, abnormalities with the urine but reports patient is not at his baseline and is less responsive and more confuse than usual.    Patient has 4 wounds, one sacral wound and one on the left hip/left buttock that are not draining and do not look infectious. He also has 2 wounds on the left foot. One wound is on the dorsal surface of the foot and is not draining but the wound at the base of the 5th metatarsal/lateral aspect of the foot is draining purulent fluid, is tender to touch and is foul smelling. Daughter reports patient receives wound care at [facility] and has received IV antibiotics in the past because of the foot wounds.      Patient was at a normal level of functioning until 2016 when he was diagnosed with Parkinson and then started having dementia gradually. Patient was able to ambulate until 2018 when he had a huge decline and a fall then he became bed bound. He currently is non verbal at home. Patient has chronic constipation and uses enema every 6 days. 2 days ago was the  last time enema was used and BM was very small in volume. Daughter reports they have been considering a PEG tube for the patient and have been in touch with a surgeon in [place] regarding PEG.    ED course:   Xray of the left foot: Erosion at the base of the 5th metatarsal may reflect osteomyelitis.  However, no comparisons available to determine the chronicity of this finding.  Sodium was 154 with Cl 116.   Cr GFR and liver enzymes wnl  Troponin, BNP normal  WBC is not elevated but H/H os 10.2/34.8        Overview/Hospital Course:  02/02 To get PEG. Pt awake. Very cachectic. Start feeding afterwards. To swb. Supportive care.  02/03 Had to have open gastrectomy. Feeding to start slowly in am. Monitor. Friend in room.  02/04 No new issues. Feedings to be started. Monitor. Very weak.      Interval History:     Review of Systems   Constitutional:  Positive for activity change, appetite change and fatigue. Negative for fever.   HENT:  Positive for trouble swallowing. Negative for congestion and hearing loss.    Respiratory:  Negative for chest tightness, shortness of breath and wheezing.    Cardiovascular:  Negative for chest pain and palpitations.   Gastrointestinal:  Negative for abdominal pain, constipation and nausea.   Genitourinary:  Negative for difficulty urinating and dysuria.   Musculoskeletal:  Negative for back pain and neck stiffness.   Skin:  Negative for pallor and rash.   Neurological:  Positive for speech difficulty. Negative for dizziness and headaches.   Psychiatric/Behavioral:  Positive for confusion. Negative for suicidal ideas.    Objective:     Vital Signs (Most Recent):  Temp: 96.7 °F (35.9 °C) (02/04/23 1600)  Pulse: (!) 57 (02/04/23 1600)  Resp: 20 (02/04/23 1600)  BP: (!) 81/47 (02/04/23 1600)  SpO2: 98 % (02/04/23 1600)   Vital Signs (24h Range):  Temp:  [96.7 °F (35.9 °C)-97.5 °F (36.4 °C)] 96.7 °F (35.9 °C)  Pulse:  [57-62] 57  Resp:  [20] 20  SpO2:  [94 %-100 %] 98 %  BP: ()/(44-68)  81/47     Weight: 52.2 kg (115 lb)  Body mass index is 17.49 kg/m².  No intake or output data in the 24 hours ending 02/04/23 2037     Physical Exam  Vitals reviewed.   Constitutional:       General: He is awake. He is not in acute distress.     Appearance: He is well-developed. He is cachectic. He is not toxic-appearing.   HENT:      Head: Normocephalic.      Nose: Nose normal.      Mouth/Throat:      Pharynx: Oropharynx is clear.   Eyes:      Extraocular Movements: Extraocular movements intact.      Pupils: Pupils are equal, round, and reactive to light.   Neck:      Thyroid: No thyroid mass.      Vascular: No carotid bruit.   Cardiovascular:      Rate and Rhythm: Normal rate and regular rhythm.      Pulses: Normal pulses.      Heart sounds: Normal heart sounds. No murmur heard.  Pulmonary:      Effort: Pulmonary effort is normal.      Breath sounds: Normal breath sounds and air entry. No wheezing.   Abdominal:      General: Bowel sounds are normal. There is no distension.      Palpations: Abdomen is soft.      Tenderness: There is no abdominal tenderness.   Musculoskeletal:         General: Normal range of motion.      Cervical back: Neck supple. No rigidity.   Skin:     General: Skin is warm.      Coloration: Skin is not jaundiced.      Findings: No lesion.   Neurological:      General: No focal deficit present.      Mental Status: He is alert. He is disoriented.      Cranial Nerves: No cranial nerve deficit.   Psychiatric:         Attention and Perception: Attention normal.         Behavior: Behavior is cooperative.         Cognition and Memory: Cognition normal.      Comments: Not talking or following commands       Significant Labs: All pertinent labs within the past 24 hours have been reviewed.  BMP:   Recent Labs   Lab 02/03/23  0640 02/04/23  0004   *  --      --    K 3.2*  --    *  --    CO2 30  --    BUN 11 8   CREATININE 1.02 0.93   CALCIUM 8.2*  --        CBC:   Recent Labs   Lab  02/03/23  0640   WBC 4.00*   HGB 10.2*   HCT 33.6*   *       CMP:   Recent Labs   Lab 02/03/23  0640 02/04/23  0004     --    K 3.2*  --    *  --    CO2 30  --    *  --    BUN 11 8   CREATININE 1.02 0.93   CALCIUM 8.2*  --    ANIONGAP 9  --          Significant Imaging: I have reviewed all pertinent imaging results/findings within the past 24 hours.      Assessment/Plan:      * Failure to thrive in adult    Cachectic state to start enteral feeding per peg    Anemia of chronic disease    H/H 10.2/34.8 with MCV if 91.6  Most likley anemia of chronic disease  Iron studies, B12 and folate are pending to r/o other causes of anemia    HLD (hyperlipidemia)    Home Crestor was changed to lipitor 40 mg daily     Dementia associated with Parkinson's disease    Continue home meds carbidopa-levodopa, donepezol and memantine    Cachexia    BMI of 17.49  Patient is unable to feed himself and daughter is the caregiver   Patient can only have Puree food.   Family has considered PEG tube and had an appointment today at [facility] to consult a surgeon regarding PEG- could not go to the appointment since they came to the ED  Patient has not ate anything in the last 2 days, has had decrease oral intake in the last 5 days  D5 half normal saline with rate of 75 cc/hr  refeeding syndrome precautions - high dose Thiamine IV ordered daily  Phosphate levels pending      Osteomyelitis of left foot    Xray: Erosion at the base of the 5th metatarsal may reflect osteomyelitis.  However, no comparisons available to determine the chronicity of this finding.  Wound culture of the latera wound at the base of the 5th metatarsal is pending  BC pending  Vanc and zosyn  Fluid maintenance   Surgery consulted  Wound care consulted   NPO  ESR, CRP, Lactic acid, Chest xray pending and UA is pending       VTE Risk Mitigation (From admission, onward)         Ordered     IP VTE HIGH RISK PATIENT  Once         01/31/23 [address] [place] [address]  sequential compression device  Until discontinued         01/31/23 2319     Reason for No Pharmacological VTE Prophylaxis  Once        Question:  Reasons:  Answer:  Physician Provided (leave comment)  Comment:  possible surgery tomorrow    01/31/23 2319                Discharge Planning   ROB: 2/3/2023     Code Status: Full Code   Is the patient medically ready for discharge?:     Reason for patient still in hospital (select all that apply): Laboratory test, Treatment, Imaging and Consult recommendations  Discharge Plan A: Home Health                  Jerrod Almanza MD  Department of Hospital Medicine   [facility]

## 2023-02-05 NOTE — PLAN OF CARE
Problem: Adult Inpatient Plan of Care  Goal: Plan of Care Review  Outcome: Ongoing, Progressing  Goal: Patient-Specific Goal (Individualized)  Outcome: Ongoing, Progressing  Goal: Absence of Hospital-Acquired Illness or Injury  Outcome: Ongoing, Progressing  Goal: Optimal Comfort and Wellbeing  Outcome: Ongoing, Progressing  Goal: Readiness for Transition of Care  Outcome: Ongoing, Progressing     Problem: Impaired Wound Healing  Goal: Optimal Wound Healing  Outcome: Ongoing, Progressing     Problem: Skin Injury Risk Increased  Goal: Skin Health and Integrity  Outcome: Ongoing, Progressing     Problem: Fall Injury Risk  Goal: Absence of Fall and Fall-Related Injury  Outcome: Ongoing, Progressing     Problem: Aspiration (Enteral Nutrition)  Goal: Absence of Aspiration Signs and Symptoms  Outcome: Ongoing, Progressing     Problem: Device-Related Complication Risk (Enteral Nutrition)  Goal: Safe, Effective Therapy Delivery  Outcome: Ongoing, Progressing     Problem: Feeding Intolerance (Enteral Nutrition)  Goal: Feeding Tolerance  Outcome: Ongoing, Progressing      3 = A little assistance 1 = Total assistance

## 2023-02-05 NOTE — SUBJECTIVE & OBJECTIVE
Interval History:     Review of Systems   Constitutional:  Positive for activity change, appetite change and fatigue. Negative for fever.   HENT:  Positive for trouble swallowing. Negative for congestion and hearing loss.    Respiratory:  Negative for chest tightness, shortness of breath and wheezing.    Cardiovascular:  Negative for chest pain and palpitations.   Gastrointestinal:  Negative for abdominal pain, constipation and nausea.   Genitourinary:  Negative for difficulty urinating and dysuria.   Musculoskeletal:  Negative for back pain and neck stiffness.   Skin:  Negative for pallor and rash.   Neurological:  Positive for speech difficulty. Negative for dizziness and headaches.   Psychiatric/Behavioral:  Positive for confusion. Negative for suicidal ideas.    Objective:     Vital Signs (Most Recent):  Temp: 96.7 °F (35.9 °C) (02/04/23 1600)  Pulse: (!) 57 (02/04/23 1600)  Resp: 20 (02/04/23 1600)  BP: (!) 81/47 (02/04/23 1600)  SpO2: 98 % (02/04/23 1600)   Vital Signs (24h Range):  Temp:  [96.7 °F (35.9 °C)-97.5 °F (36.4 °C)] 96.7 °F (35.9 °C)  Pulse:  [57-62] 57  Resp:  [20] 20  SpO2:  [94 %-100 %] 98 %  BP: ()/(44-68) 81/47     Weight: 52.2 kg (115 lb)  Body mass index is 17.49 kg/m².  No intake or output data in the 24 hours ending 02/04/23 2037     Physical Exam  Vitals reviewed.   Constitutional:       General: He is awake. He is not in acute distress.     Appearance: He is well-developed. He is cachectic. He is not toxic-appearing.   HENT:      Head: Normocephalic.      Nose: Nose normal.      Mouth/Throat:      Pharynx: Oropharynx is clear.   Eyes:      Extraocular Movements: Extraocular movements intact.      Pupils: Pupils are equal, round, and reactive to light.   Neck:      Thyroid: No thyroid mass.      Vascular: No carotid bruit.   Cardiovascular:      Rate and Rhythm: Normal rate and regular rhythm.      Pulses: Normal pulses.      Heart sounds: Normal heart sounds. No murmur  heard.  Pulmonary:      Effort: Pulmonary effort is normal.      Breath sounds: Normal breath sounds and air entry. No wheezing.   Abdominal:      General: Bowel sounds are normal. There is no distension.      Palpations: Abdomen is soft.      Tenderness: There is no abdominal tenderness.   Musculoskeletal:         General: Normal range of motion.      Cervical back: Neck supple. No rigidity.   Skin:     General: Skin is warm.      Coloration: Skin is not jaundiced.      Findings: No lesion.   Neurological:      General: No focal deficit present.      Mental Status: He is alert. He is disoriented.      Cranial Nerves: No cranial nerve deficit.   Psychiatric:         Attention and Perception: Attention normal.         Behavior: Behavior is cooperative.         Cognition and Memory: Cognition normal.      Comments: Not talking or following commands       Significant Labs: All pertinent labs within the past 24 hours have been reviewed.  BMP:   Recent Labs   Lab 02/03/23  0640 02/04/23  0004   *  --      --    K 3.2*  --    *  --    CO2 30  --    BUN 11 8   CREATININE 1.02 0.93   CALCIUM 8.2*  --        CBC:   Recent Labs   Lab 02/03/23  0640   WBC 4.00*   HGB 10.2*   HCT 33.6*   *       CMP:   Recent Labs   Lab 02/03/23  0640 02/04/23  0004     --    K 3.2*  --    *  --    CO2 30  --    *  --    BUN 11 8   CREATININE 1.02 0.93   CALCIUM 8.2*  --    ANIONGAP 9  --          Significant Imaging: I have reviewed all pertinent imaging results/findings within the past 24 hours.

## 2023-02-05 NOTE — PLAN OF CARE
Problem: Adult Inpatient Plan of Care  Goal: Plan of Care Review  Outcome: Ongoing, Progressing  Goal: Patient-Specific Goal (Individualized)  Outcome: Ongoing, Progressing  Goal: Absence of Hospital-Acquired Illness or Injury  Outcome: Ongoing, Progressing  Goal: Optimal Comfort and Wellbeing  Outcome: Ongoing, Progressing  Goal: Readiness for Transition of Care  Outcome: Ongoing, Progressing     Problem: Impaired Wound Healing  Goal: Optimal Wound Healing  Outcome: Ongoing, Progressing     Problem: Skin Injury Risk Increased  Goal: Skin Health and Integrity  Outcome: Ongoing, Progressing     Problem: Fall Injury Risk  Goal: Absence of Fall and Fall-Related Injury  Outcome: Ongoing, Progressing     Problem: Aspiration (Enteral Nutrition)  Goal: Absence of Aspiration Signs and Symptoms  Outcome: Ongoing, Progressing     Problem: Device-Related Complication Risk (Enteral Nutrition)  Goal: Safe, Effective Therapy Delivery  Outcome: Ongoing, Progressing     Problem: Feeding Intolerance (Enteral Nutrition)  Goal: Feeding Tolerance  Outcome: Ongoing, Progressing

## 2023-02-06 LAB
AMMONIA PLAS-SCNC: 23 ΜMOL/L (ref 11–32)
ANION GAP SERPL CALCULATED.3IONS-SCNC: 9 MMOL/L (ref 7–16)
BACTERIA BLD CULT: NORMAL
BACTERIA BLD CULT: NORMAL
BASOPHILS # BLD AUTO: 0.01 K/UL (ref 0–0.2)
BASOPHILS NFR BLD AUTO: 0.3 % (ref 0–1)
BUN SERPL-MCNC: 9 MG/DL (ref 7–18)
BUN/CREAT SERPL: 10 (ref 6–20)
CALCIUM SERPL-MCNC: 8.2 MG/DL (ref 8.5–10.1)
CHLORIDE SERPL-SCNC: 106 MMOL/L (ref 98–107)
CO2 SERPL-SCNC: 31 MMOL/L (ref 21–32)
CREAT SERPL-MCNC: 0.88 MG/DL (ref 0.7–1.3)
CRP SERPL-MCNC: 3.1 MG/DL (ref 0–0.8)
DIFFERENTIAL METHOD BLD: ABNORMAL
EGFR (NO RACE VARIABLE) (RUSH/TITUS): 82 ML/MIN/1.73M²
EOSINOPHIL # BLD AUTO: 0.09 K/UL (ref 0–0.5)
EOSINOPHIL NFR BLD AUTO: 2.9 % (ref 1–4)
ERYTHROCYTE [DISTWIDTH] IN BLOOD BY AUTOMATED COUNT: 15.9 % (ref 11.5–14.5)
GLUCOSE SERPL-MCNC: 106 MG/DL (ref 74–106)
HCT VFR BLD AUTO: 31.6 % (ref 40–54)
HGB BLD-MCNC: 9.8 G/DL (ref 13.5–18)
IMM GRANULOCYTES # BLD AUTO: 0.03 K/UL (ref 0–0.04)
IMM GRANULOCYTES NFR BLD: 1 % (ref 0–0.4)
LYMPHOCYTES # BLD AUTO: 0.76 K/UL (ref 1–4.8)
LYMPHOCYTES NFR BLD AUTO: 24.8 % (ref 27–41)
MAGNESIUM SERPL-MCNC: 2 MG/DL (ref 1.7–2.3)
MCH RBC QN AUTO: 27.2 PG (ref 27–31)
MCHC RBC AUTO-ENTMCNC: 31 G/DL (ref 32–36)
MCV RBC AUTO: 87.8 FL (ref 80–96)
MONOCYTES # BLD AUTO: 0.39 K/UL (ref 0–0.8)
MONOCYTES NFR BLD AUTO: 12.7 % (ref 2–6)
MPC BLD CALC-MCNC: 11.5 FL (ref 9.4–12.4)
NEUTROPHILS # BLD AUTO: 1.78 K/UL (ref 1.8–7.7)
NEUTROPHILS NFR BLD AUTO: 58.3 % (ref 53–65)
NRBC # BLD AUTO: 0 X10E3/UL
NRBC, AUTO (.00): 0 %
PLATELET # BLD AUTO: 103 K/UL (ref 150–400)
POTASSIUM SERPL-SCNC: 3.9 MMOL/L (ref 3.5–5.1)
RBC # BLD AUTO: 3.6 M/UL (ref 4.6–6.2)
SODIUM SERPL-SCNC: 142 MMOL/L (ref 136–145)
VANCOMYCIN TROUGH SERPL-MCNC: 11.9 ΜG/ML (ref 10–20)
WBC # BLD AUTO: 3.06 K/UL (ref 4.5–11)

## 2023-02-06 PROCEDURE — 25000003 PHARM REV CODE 250

## 2023-02-06 PROCEDURE — 80202 ASSAY OF VANCOMYCIN: CPT | Performed by: HOSPITALIST

## 2023-02-06 PROCEDURE — 83735 ASSAY OF MAGNESIUM: CPT | Performed by: HOSPITALIST

## 2023-02-06 PROCEDURE — 82140 ASSAY OF AMMONIA: CPT | Performed by: HOSPITALIST

## 2023-02-06 PROCEDURE — 99232 PR SUBSEQUENT HOSPITAL CARE,LEVL II: ICD-10-PCS | Mod: ,,, | Performed by: HOSPITALIST

## 2023-02-06 PROCEDURE — 86140 C-REACTIVE PROTEIN: CPT | Performed by: HOSPITALIST

## 2023-02-06 PROCEDURE — 63600175 PHARM REV CODE 636 W HCPCS: Performed by: HOSPITALIST

## 2023-02-06 PROCEDURE — 80048 BASIC METABOLIC PNL TOTAL CA: CPT | Performed by: HOSPITALIST

## 2023-02-06 PROCEDURE — 99232 SBSQ HOSP IP/OBS MODERATE 35: CPT | Mod: ,,, | Performed by: HOSPITALIST

## 2023-02-06 PROCEDURE — 11000001 HC ACUTE MED/SURG PRIVATE ROOM

## 2023-02-06 PROCEDURE — 85025 COMPLETE CBC W/AUTO DIFF WBC: CPT | Performed by: HOSPITALIST

## 2023-02-06 PROCEDURE — 63600175 PHARM REV CODE 636 W HCPCS

## 2023-02-06 PROCEDURE — 25000003 PHARM REV CODE 250: Performed by: HOSPITALIST

## 2023-02-06 RX ORDER — SULFAMETHOXAZOLE AND TRIMETHOPRIM 200; 40 MG/5ML; MG/5ML
20 SUSPENSION ORAL EVERY 12 HOURS
Status: DISCONTINUED | OUTPATIENT
Start: 2023-02-06 | End: 2023-02-07 | Stop reason: HOSPADM

## 2023-02-06 RX ADMIN — Medication 2 CAPSULE: at 05:02

## 2023-02-06 RX ADMIN — CARBIDOPA AND LEVODOPA 1 TABLET: 25; 100 TABLET ORAL at 11:02

## 2023-02-06 RX ADMIN — PIPERACILLIN AND TAZOBACTAM 4.5 G: 4; .5 INJECTION, POWDER, FOR SOLUTION INTRAVENOUS; PARENTERAL at 11:02

## 2023-02-06 RX ADMIN — PIPERACILLIN AND TAZOBACTAM 4.5 G: 4; .5 INJECTION, POWDER, FOR SOLUTION INTRAVENOUS; PARENTERAL at 03:02

## 2023-02-06 RX ADMIN — DONEPEZIL HYDROCHLORIDE 10 MG: 5 TABLET, FILM COATED ORAL at 11:02

## 2023-02-06 RX ADMIN — Medication 2 CAPSULE: at 07:02

## 2023-02-06 RX ADMIN — MUPIROCIN: 20 OINTMENT TOPICAL at 09:02

## 2023-02-06 RX ADMIN — POTASSIUM BICARBONATE 20 MEQ: 782 TABLET, EFFERVESCENT ORAL at 11:02

## 2023-02-06 RX ADMIN — FOLIC ACID: 5 INJECTION, SOLUTION INTRAMUSCULAR; INTRAVENOUS; SUBCUTANEOUS at 12:02

## 2023-02-06 RX ADMIN — ATORVASTATIN CALCIUM 40 MG: 40 TABLET, FILM COATED ORAL at 11:02

## 2023-02-06 RX ADMIN — MEMANTINE 10 MG: 10 TABLET ORAL at 11:02

## 2023-02-06 RX ADMIN — PIPERACILLIN AND TAZOBACTAM 4.5 G: 4; .5 INJECTION, POWDER, FOR SOLUTION INTRAVENOUS; PARENTERAL at 05:02

## 2023-02-06 RX ADMIN — VANCOMYCIN HYDROCHLORIDE 1000 MG: 1 INJECTION, POWDER, LYOPHILIZED, FOR SOLUTION INTRAVENOUS at 11:02

## 2023-02-06 RX ADMIN — Medication 2 CAPSULE: at 11:02

## 2023-02-06 NOTE — SUBJECTIVE & OBJECTIVE
Interval History:     Review of Systems   Constitutional:  Positive for activity change, appetite change and fatigue. Negative for fever.   HENT:  Positive for trouble swallowing. Negative for congestion and hearing loss.    Respiratory:  Negative for chest tightness, shortness of breath and wheezing.    Cardiovascular:  Negative for chest pain and palpitations.   Gastrointestinal:  Negative for abdominal pain, constipation and nausea.   Genitourinary:  Negative for difficulty urinating and dysuria.   Musculoskeletal:  Negative for back pain and neck stiffness.   Skin:  Negative for pallor and rash.   Neurological:  Positive for speech difficulty. Negative for dizziness and headaches.   Psychiatric/Behavioral:  Positive for confusion. Negative for suicidal ideas.    Objective:     Vital Signs (Most Recent):  Temp: 96.9 °F (36.1 °C) (02/05/23 1700)  Pulse: 60 (02/05/23 1700)  Resp: 16 (02/05/23 1700)  BP: 129/64 (02/05/23 1700)  SpO2: 98 % (02/05/23 1700)   Vital Signs (24h Range):  Temp:  [96.9 °F (36.1 °C)-97.8 °F (36.6 °C)] 96.9 °F (36.1 °C)  Pulse:  [58-66] 60  Resp:  [14-18] 16  SpO2:  [97 %-100 %] 98 %  BP: (128-153)/(58-72) 129/64     Weight: 52.2 kg (115 lb)  Body mass index is 17.49 kg/m².    Intake/Output Summary (Last 24 hours) at 2/5/2023 2201  Last data filed at 2/5/2023 1833  Gross per 24 hour   Intake 3635.05 ml   Output --   Net 3635.05 ml        Physical Exam  Vitals reviewed.   Constitutional:       General: He is awake. He is not in acute distress.     Appearance: He is well-developed. He is cachectic. He is not toxic-appearing.   HENT:      Head: Normocephalic.      Nose: Nose normal.      Mouth/Throat:      Pharynx: Oropharynx is clear.   Eyes:      Extraocular Movements: Extraocular movements intact.      Pupils: Pupils are equal, round, and reactive to light.   Neck:      Thyroid: No thyroid mass.      Vascular: No carotid bruit.   Cardiovascular:      Rate and Rhythm: Normal rate and regular  rhythm.      Pulses: Normal pulses.      Heart sounds: Normal heart sounds. No murmur heard.  Pulmonary:      Effort: Pulmonary effort is normal.      Breath sounds: Normal breath sounds and air entry. No wheezing.   Abdominal:      General: Bowel sounds are normal. There is no distension.      Palpations: Abdomen is soft.      Tenderness: There is no abdominal tenderness.   Musculoskeletal:         General: Normal range of motion.      Cervical back: Neck supple. No rigidity.   Skin:     General: Skin is warm.      Coloration: Skin is not jaundiced.      Findings: No lesion.   Neurological:      General: No focal deficit present.      Mental Status: He is alert. He is disoriented.      Cranial Nerves: No cranial nerve deficit.   Psychiatric:         Attention and Perception: Attention normal.         Behavior: Behavior is cooperative.         Cognition and Memory: Cognition normal.      Comments: Not talking or following commands       Significant Labs: All pertinent labs within the past 24 hours have been reviewed.  BMP:   Recent Labs   Lab 02/04/23  0004   BUN 8   CREATININE 0.93       CBC:   No results for input(s): WBC, HGB, HCT, PLT in the last 48 hours.    CMP:   Recent Labs   Lab 02/04/23  0004   BUN 8   CREATININE 0.93         Significant Imaging: I have reviewed all pertinent imaging results/findings within the past 24 hours.

## 2023-02-06 NOTE — PROGRESS NOTES
"[facility]  Adult Nutrition  Follow-up Note         Reason for Assessment  Reason For Assessment: RD follow-up  Nutrition Risk Screen: tube feeding or parenteral nutrition    RD follow up. RD recommendation was to initiate tube feeding Jevity 1.5 (or Isosource 1.5 if Jevity 1.5 out of stock) at 10ml/hr x24 hours d/t degree of malnutrition and risk for refeeding, monitor electrolytes (K, Mg,Phos) over initial 24 hrs and replete to WNL as needed; then if after 24 hrs electrolytes WNL begin advancing tube feedings by 10ml q 6-8hrs to goal rate.     Per flowsheets, tube feeding initiated 10ml 2/4 1834, advanced to 15ml 2/4 2044, and last documented rate 20ml 2/5 0716. Waiting Phos lab; K and Mg WNL.     Recommend slowly advancing up Jevity 1.5 (or Isosource 1.5 if Jevity 1.5 out of stock) tube feedings by 10 ml q 6-8 hrs to goal rate of 45 ml/hr to provide: 1620 kcal, 68 g PRO, 743 ml free water per tube feeding formula per day. Recommend free water flush of 35 ml/hr, providing 840 ml free water/day. Due to wounds, also recommend addition of Angel(or Arginaid, which can be used in place of Angel if Angel out of stock) BID for additional 180 kcal, 5 g PRO. Total of 1800 kcal, 73 g PRO, 1583 ml free water per day.     Keep HOB 30-45 degrees, monitor abdominal pain/distention, n/v/c/d, gastric residuals >500ml.    Per MD note since last RD review:   "02/04 No new issues. Feedings to be started. Monitor. Very weak.  02/05 Tolerating feeding so far Friend and daughter in room and answered questions. They still wish to take pt home at [place] and get  to help."     Recommend slowly advancing up Jevity 1.5 (or Isosource 1.5 if Jevity 1.5 out of stock) tube feedings by 10 ml q 6-8 hrs to goal rate of 45 ml/hr to provide: 1620 kcal, 68 g PRO, 743 ml free water per tube feeding formula per day. Recommend free water flush of 35 ml/hr, providing 840 ml free water/day. Due to wounds, also recommend addition of " Angel(or Arginaid, which can be used in place of Angel if Angel out of stock) BID for additional 180 kcal, 5 g PRO. Total of 1800 kcal, 73 g PRO, 1583 ml free water per day.      Patient followed by wound care for wounds. Recommend continuing Angel (or Arginaid if Angel out of stock) BID to aid in wound healing. Recommend 500mg Vit C BID + 220mg ZnSO4 BID + MVI daily to aid in wound healing.    Last BM 2/05 per flowsheets - pt receiving polyethylene glycol per med list. Will continue to monitor tube feeding adequacy/tolerance, labs, meds, weights, updates in patient condition. RD following.      Malnutrition  Is Patient Malnourished: Yes      Malnutrition Assessment  Malnutrition Type: acute illness or injury  Energy Intake: severe energy intake  Skin (Micronutrient): dry, pallor, thinned, wounds unhealed  Gums (Micronutrient): inflamed, red  Neck/Chest (Micronutrient): muscle wasting, bony prominence, neck veins distended, subcutaneous fat loss  Musculoskeletal/Lower Extremities: muscle wasting, subcutaneous fat loss       Energy Intake (Malnutrition): less than or equal to 50% for greater than or equal to 1 month  Subcutaneous Fat (Malnutrition): severe depletion  Muscle Mass (Malnutrition): severe depletion   Orbital Region (Subcutaneous Fat Loss): severe depletion  Upper Arm Region (Subcutaneous Fat Loss): severe depletion  Thoracic and Lumbar Region: severe depletion   Mandaen Region (Muscle Loss): severe depletion  Clavicle Bone Region (Muscle Loss): severe depletion  Clavicle and Acromion Bone Region (Muscle Loss): severe depletion  Scapular Bone Region (Muscle Loss): severe depletion  Dorsal Hand (Muscle Loss): severe depletion  Patellar Region (Muscle Loss): severe depletion  Anterior Thigh Region (Muscle Loss): severe depletion  Posterior Calf Region (Muscle Loss): severe depletion       Subcutaneous Fat Loss (Final Summary): severe protein-calorie malnutrition  Muscle Loss Evaluation (Final Summary):  "severe protein-calorie malnutrition       RD consult received 2/01. Pt with MST score of 3. Pt seen 2/01 for nutrition assessment. Spoke with pt daughter at bedside, pt did not arouse during NFPE. Daughter reports he has been eating poorly since she beileves Nov/Dec, though degree of malnutrition appears his oral intakes have likely been poor for a prolonged period of time. Pt with severe muscle and fat mass loss present, bones easily palpated on NFPE. Daughter reports he only consumes a puree diet but that recently he has been eating less and less, typically eating something every other day. Reports at one time she was noticing his gums were red, inflamed, and bleeding and she thought this could be contributing to his decreased intakes. Stated she provided oral care and this helped him consume more 1-2 days but then his intakes declined again. Reports he does not take any sort of daily supplement (Ensure, Boost, etc.) or vitamin supplement. Was providing him smoothies/baby food. She reports SLP evaluation with home health, but was unable to report their findings/recommendations, stating they just said his mental status was declined. Note PEG discussion in H&P.       She reports she has a home suction device and was having to suction him more frequently, noting increased phlegm that she thinks was from a smoothie she was giving him. She also notes episodes where he was "chewing" on food in his mouth for prolonged periods of time, seems consistent with pocketing. Pt meeting malnutrition criteria for severe acute on chronic protein calorie malnutrition.      Nutrition Diagnosis  Malnutrition (Severe)   related to Impaired cognitive or learning abilities /psychological causes/ Altered Mental Status as evidenced by severe muscle, fat mass loss, poor oral intakes     Nutrition Diagnosis Status: Chronic/Continues  Comments: gastrostomy placed 2/2 4964     Nutrition Risk  Level of Risk/Frequency of Follow-up: high   " Chewing or Swallowing Difficulty?: gastrostomy placed 2/2 1436    Estimated/Assessed Needs    Temp: 97.7 °F (36.5 °C)Axillary  Weight Used For Calorie Calculations: 52.5 kg (115 lb 11.9 oz)   Energy Need Method: Kcal/kg Energy Calorie Requirements (kcal): 7462-7779 (30-35cals/kg)  Weight Used For Protein Calculations: 52.2 kg (115 lb 1.3 oz)  Protein Requirements: 62-78 g PRO (1.2-1.5 g PRO/kg)  Estimated Fluid Requirement Method: RDA Method    RDA Method (mL): 1575     Nutrition Prescription / Recommendations  Recommendation/Intervention: Recommend slowly advancing up Jevity 1.5 (or Isosource 1.5 if Jevity 1.5 out of stock) tube feedings by 10 ml q 6-8 hrs to goal rate of 45 ml/hr to provide: 1620 kcal, 68 g PRO, 743 ml free water per tube feeding formula per day. Recommend free water flush of 35 ml/hr, providing 840 ml free water/day. Due to wounds, also recommend addition of Angel(or Arginaid, which can be used in place of Angel if Angel out of stock) BID for additional 180 kcal, 5 g PRO. Total of 1800 kcal, 73 g PRO, 1583 ml free water per day. Keep HOB 30-45 degrees, monitor abdominal pain/distention, n/v/c/d, gastric residuals >500ml.  Goals: tube feeding adequacy/tolerance, weight stability, lab stability  Nutrition Goal Status: progressing towards goal  Current Diet Order: NPO  Nutrition Order Comments: pt receiving enteral nutrition via gastrostomy  Current Nutrition Support Formula Ordered: Jevity 1.5  Current Nutrition Support Rate Ordered: 45 (ml)  Current Nutrition Support Frequency Ordered: hourly  Recommended Diet: Enteral Nutrition  Recommended Oral Supplement: No Oral Supplements  Is Nutrition Support Recommended: Yes      Needs Calculated  Energy Need Method: Kcal/kg Energy Calorie Requirements (kcal): 8404-8700 (30-35cals/kg)  Protein Requirements: 62-78 g PRO (1.2-1.5 g PRO/kg)  Enteral Nutrition   Enteral Nutrition Formula Provides:  1620 kcals   68 g Protein  232 g Carbohydrates  53 g Fat   743  ml Fluid without Flush                          840 ml Fluid by flush   1583 ml Total Fluid  Enteral Nutrition Recommended Order:  Tube feeding via NG/ Jackson Sump  Tube feeding formula: Jevity 1.5 Continuous 45 ml/h  Free Water Flush: 35 ml hourly  Modular Supplements:Angel 2 times a day  Enteral Nutrition meets needs?: yes  Enteral Nutrition Status: Continue Enteral Nutrition  Is Education Recommended: No    Monitor and Evaluation  % current Intake: Enteral Nutrition progressing to goal  % intake to meet estimated needs: Enteral Nutrition   Food and Nutrient Intake: enteral nutrition intake  Food and Nutrient Adminstration: enteral and parenteral nutrition administration  Anthropometric Measurements: weight, weight change, body mass index  Biochemical Data, Medical Tests and Procedures: electrolyte and renal panel, lipid profile, gastrointestinal profile, glucose/endocrine profile, inflammatory profile  Nutrition-Focused Physical Findings: overall appearance, extremities, muscles and bones, head and eyes, skin  Enteral Calories (kcal): 720 (tf rate @ 20ml/hr per flowsheets)  Enteral Protein (gm): 31 (tf rate @ 20ml/hr per flowsheets)  Enteral (Free Water) Fluid (mL): 330 (tf rate @ 20ml/hr per flowsheets)  Free Water Flush Fluid (mL): 840  Other Calories (kcal): 180 (Angel BID)  Total Calories (kcal): 900  Total Calories (kcal/kg): 17  % Kcal Needs: 57  Other Protein (gm): 5 (Angel BID)  Total Protein (gm): 36  Total Protein (gm/kg): 0.7  % Protein Needs: 58  Tolerance: tolerating    Current Medical Diagnosis and Past Medical History  Diagnosis: other (see comments) (failure to thrive in adult)  Past Medical History:   Diagnosis Date    Parkinson's disease      Nutrition/Diet History  Spiritual, Cultural Beliefs, [employer] Practices, Values that Affect Care: no  Food Allergies: NKFA  Factors Affecting Nutritional Intake: NPO    Lab/Procedures/Meds  Recent Labs   Lab 02/06/23  0402      K 3.9   BUN 9  "  CREATININE 0.88      CALCIUM 8.2*        Last A1c: No results found for: HGBA1C  Lab Results   Component Value Date    RBC 3.60 (L) 02/06/2023    HGB 9.8 (L) 02/06/2023    HCT 31.6 (L) 02/06/2023    MCV 87.8 02/06/2023    MCH 27.2 02/06/2023    MCHC 31.0 (L) 02/06/2023    TIBC 185 (L) 02/01/2023     Pertinent Labs Reviewed: reviewed  Ca 8.2 (L) - replete to WNL as needed  Pertinent Medications Reviewed: reviewed  Pertinent Medications Comments: atorvastatin, carbidopa-levodopa, donepezil,  lactobacillus acidophilus, memantine, zosyn, polyethylene glycol, potassium bicarbonate, thiamine/MVI/folic acid, vancomycin, IVF 100ml/hr    Anthropometrics  Temp: 97.7 °F (36.5 °C)  Height Method: Stated  Height: 5' 8" (172.7 cm)  Height (inches): 68 in  Weight Method: Standard Scale  Weight: 52.2 kg (115 lb)  Weight (lb): 115 lb  Ideal Body Weight (IBW), Male: 154 lb  % Ideal Body Weight, Male (lb): 74.68 %  % Ideal Body Weight Malnutrition: 70-79%: moderate deficit  BMI (Calculated): 17.5  BMI Grade: 17 - 18.4 protein-energy malnutrition grade I     Nutrition by Nursing  Diet/Nutrition Received: NPO  Intake (%): 0%     Diet/Feeding Tolerance: unable to swallow  Last Bowel Movement: 02/05/23       Gastrostomy/Enterostomy 02/02/23 1436 LUQ feeding-Feeding Type: intermittent, by pump       Gastrostomy/Enterostomy 02/02/23 1436 LUQ feeding-Current Rate (mL/hr): 20 mL/hr       Gastrostomy/Enterostomy 02/02/23 1436 LUQ feeding-Goal Rate (mL/hr): 45 mL/hr       Gastrostomy/Enterostomy 02/02/23 1436 LUQ feeding-Formula Name: [facility]    Nutrition Follow-Up  RD Follow-up?: Yes  "

## 2023-02-06 NOTE — PROGRESS NOTES
[facility]  General Surgery  Progress Note    Subjective:     History of Present Illness:  89-year-old male patient admitted to the hospital last night due to failure to thrive.  He has had a progressive decline in his mental status and only eat small amount of pureed food every of the day, according to his daughter.  The primary service has talked to the family extensively about hospice and comfort care, but they are not ready to commit him to this.  A feeding tube is requested.    In addition, the patient was noted to have some foot wounds and a left foot x-ray was performed revealing erosion at the base of the 5th metatarsal head, laterally.  Surgery was asked to evaluate this, as well.      Post-Op Info:  Procedure(s) (LRB):  EGD, (N/A)  DEBRIDEMENT, FOOT (Left)  GASTROSTOMY (N/A)   5 Days Post-Op     No new subjective & objective note has been filed under this hospital service since the last note was generated.    Assessment/Plan:     Cachexia  After discussion with Dr. Almanza who attempted to obtain hospice and comfort measure status from a family, and after discussion with the family, it is decided to proceed with placement of percutaneous gastrostomy tube, with opened tube if indicated.  Patient's daughter expresses understanding to risks and benefits discussed including infection, bleeding, malpositioning, and potential problems with anesthesia with a heart or lungs.  She expresses understanding wishes to proceed.    Osteomyelitis of left foot  Evaluate in the OR to see if bone is exposed, with debridement if indicated    02/03/2023   Bone fragment isolated staph aureus and Gram-negative bacilli on preliminary culture.  Vanc and Zosyn  Dressing change with wet-to-dry      Plan to use acetic acid with dressing changes to cover for P. aeruginosa.    Malcom Dias, FNP  General Surgery  [facility]

## 2023-02-06 NOTE — PHYSICIAN QUERY
PT Name: Juliet Arboleda  MR #: [id number]    DOCUMENTATION CLARIFICATION     CDS: Francis JAUREGUI,RN        Contact information:[email or web address]     This form is a permanent document in the medical record.     Query Date: February 6, 2023    By submitting this query, we are merely seeking further clarification of documentation.. Please utilize your independent clinical judgment when addressing the question(s) below.    The medical record contains the following:   Indicators  Supporting Clinical Findings Location in Medical Record   x Energy Intake less than or equal to 50% for greater than or equal to 1 month  2/3 Adult Nutrition Follow-up Note    Weight Loss     x Fat Loss Subcutaneous Fat Loss (Final Summary): severe protein-calorie malnutrition 2/3  Adult Nutrition Follow-up Note   x Muscle Loss Muscle Mass (Malnutrition): severe depletion  2/3  Adult Nutrition Follow-up Note    Edema/Fluid Accumulation      Reduced  Strength (by dynamometer)     x Weight, BMI, Usual Body Weight WT: 115 lb  BMI: 17.5  2/3  Adult Nutrition Follow-up Note   x Delayed Wound Healing Patient has 4 wounds, one sacral wound and one on the left hip/left buttock that are not draining and do not look infectious. He also has 2 wounds on the left foot. One wound is on the dorsal surface of the foot and is not draining but the wound at the base of the 5th metatarsal/lateral aspect of the foot is draining purulent fluid, is tender to touch and is foul smelling.      2/1  HM H&P   x Registered Dietician Diagnosis Malnutrition  Is Patient Malnourished: Yes     Malnutrition (Severe)   related to Impaired cognitive or learning abilities /psychological causes/ Altered Mental Status as evidenced by severe muscle, fat mass loss, poor oral intakes  failure to thrive in adult     2/3  Adult Nutrition Follow-up Note            x Acute or Chronic Illness Foul wound of right foot, dementia, bed bound, 4 wounds, one sacral wound  and one  on  the left hip/left buttock, 2 wounds on the left foot, peg tube, failure to thrive in the setting of osteomyelitis at the base of left 5 th toe.      2/1 HM H&P    Social or Environmental Circumstances     x Treatment Recommend slowly advancing up Jevity 1.5 (or Isosource 1.5 if Jevity 1.5 out of stock) tube feedings by 10 ml q 6-8 hrs to goal rate of 45 ml/hr to provide: 1620 kcal, 68 g PRO, 743 ml free water per tube feeding formula per day. Recommend free water flush of 35 ml/hr, providing 840 ml free water/day. Due to wounds, also recommend addition of Angel(or Arginaid, which can be used in place of Angel if Angel out of stock) BID for additional 180 kcal, 5 g PRO. Total of 1800 kcal, 73 g PRO, 1583 ml free water per day.         An open gastrostomy was performed using Ke gastrostomy technique.          2/06 Adult Nutrition Follow-up Note                   2/3 Adult Nutrition Follow-up Note      x Other Keep HOB 30-45 degrees, monitor abdominal pain/distention, n/v/c/d, gastric residuals >500ml.     Subcutaneous Fat Loss (Final Summary): severe protein-calorie malnutrition  Muscle Loss Evaluation (Final Summary): severe protein-calorie malnutrition        Daughter reports he has been eating poorly since she beileves Nov/Dec, though degree of malnutrition appears his oral intakes have likely been poor for a prolonged period of time.       2/3 Adult Nutrition Follow-up    Note     Academy of Nutrition and Dietetics (Academy) and the American Society for Parenteral and Enteral Nutrition (A.S.P.E.N.) Clinical Characteristics to support Malnutrition   Malnutrition in the Context of Acute Illness or Injury Malnutrition in the Context of Chronic Illness or Injury Malnutrition in the Context of Social or Environmental Circumstances   Malnutrition Level Moderate Severe Moderate Severe   Moderate   Severe   Energy Intake <75%                   >7 days <50%                 >5 days <75%           >1 month <75%                       >1 month   <75% for >3 months   <50% for >1 month   Weight Loss   1-2% in 1 week >2% in 1 week 5% in 1 month >5% in 1 month 5% in 1 month >5% in 1 month    5% in 1 month >5% in 1 month 7.5% in 3 months >7.5% in 3 months 7.5% in 3 months >7.5% in 3 months    7.5% in 3 months >7.5% in 3 months 10% in 6 months >10% in 6 months 10% in 6 months >10% in 6 months        20% in 1 year                    >20% in 1 year                                                                  20% in 1 year                            >20% in 1 year                                                  Subcutaneous Fat Loss Mild  Moderate  Mild  Severe    Mild   Severe   Muscle Loss Mild  Moderate  Mild  Severe    Mild   Severe   Edema/Fluid Accumulation Mild Moderate to severe  Mild  Severe   Mild   Severe   Reduced  Strength         (based on standards supplied by  of dynamometer) N/A Measurably reduced N/A Measurably reduced N/A Measurably reduced     Criteria for mild malnutrition is defined as 1 characteristic outlined above within the established moderate or severe parameters.  A minimum of 2 out of the 6 characteristics noted above are recommended for a diagnosis of moderate or severe malnutrition.  Chronic illness/injury is a disease/condition lasting 3 months or longer.    The noted clinical guidelines are only system guidelines and do not replace the providers clinical judgment.    Provider, please confirm the degree of malnutrition  diagnosis  associated with above clinical findings.    [ x ] Severe Malnutrition - a minimum of 2 of the 6 severe malnutrition characteristics noted above    [  ] Malnutrition, Unspecified degree   [  ] Other Nutritional Diagnosis (please specify): _______     Please document in your progress notes daily for the duration of treatment until resolved and  include in your discharge summary.    **I did see pt and did progress notes where I documented pt with severe cachectic  state but I turned pt over to Dr Heller and she DC summary id hers. RR      References:    KRISTAL Herrera, & SG Anand (2022, April). Assessment and management of anorexia and cachexia in palliative care. Retrieved May 23, 2022, from [email or web address]-and-management-[email or web address]-anorexia-[email or web address]-cachexia-in-[email or web address]-care?[email or web address]     IGNACIO Solomon, PhD, RD, Kendra MILLS P., PhD, RN, TRACIE Angulo MD, PhD, Krysten CATALAN A., MS, RD, [facility], FIORELLA Yepez, MS, RD, The Academy Malnutrition Work Group, The A.S.P.E.N. Board of Directors. (2012). Consensus Statement: Academy of Nutrition and Dietetics and American Society for Parenteral and Enteral Nutrition: Characteristics Recommended for the Identification and Documentation of Adult Malnutrition (Undernutrition). Journal of Parenteral and Enteral Nutrition, 36(3), 275-283. doi:10.1177/5721696387575474     Form No. 15859

## 2023-02-06 NOTE — SUBJECTIVE & OBJECTIVE
Interval History:   Dressing changed today.  No erythema or drainage from the PEG site.  Granulated tissue at the base of foot wounds. Will continue to follow, though he can be discharged home at the discretion of hospital medicine. Has attentive family member.    Medications:  Continuous Infusions:   dextrose 5 % and 0.45 % NaCl 75 mL/hr at 02/05/23 2206     Scheduled Meds:   atorvastatin  40 mg Per G Tube Daily    carbidopa-levodopa  mg  1 tablet Per G Tube Daily    donepeziL  10 mg Per G Tube Daily    Lactobacillus acidophilus  2 capsule Per G Tube TID WM    memantine  10 mg Per G Tube Daily    mupirocin   Nasal BID    piperacillin-tazobactam (ZOSYN) IVPB  4.5 g Intravenous Q8H    polyethylene glycol  17 g Per G Tube Daily    Banana bag   Intravenous Daily    vancomycin (VANCOCIN) IVPB  1,000 mg Intravenous Q24H     PRN Meds:acetaminophen, acetaminophen, dextrose 10%, dextrose 10%, glucagon (human recombinant), glucose, glucose, morphine, naloxone, ondansetron, ondansetron, sodium chloride 0.9%, vancomycin - pharmacy to dose     Review of patient's allergies indicates:   Allergen Reactions    Bactrim [sulfamethoxazole-trimethoprim]      Objective:     Vital Signs (Most Recent):  Temp: 98.2 °F (36.8 °C) (02/06/23 0800)  Pulse: 60 (02/06/23 0800)  Resp: 16 (02/06/23 0800)  BP: (!) 112/56 (02/06/23 0800)  SpO2: 96 % (02/06/23 0800)   Vital Signs (24h Range):  Temp:  [96.9 °F (36.1 °C)-98.2 °F (36.8 °C)] 98.2 °F (36.8 °C)  Pulse:  [58-63] 60  Resp:  [16-19] 16  SpO2:  [96 %-100 %] 96 %  BP: (112-153)/(47-69) 112/56     Weight: 52.2 kg (115 lb)  Body mass index is 17.49 kg/m².    Intake/Output - Last 3 Shifts         02/04 0700  02/05 0659 02/05 0700 02/06 0659 02/06 0700  02/07 0659    P.O.  0     I.V. (mL/kg)  1100 (21.1)     NG/ 1335 35    IV Piggyback 450.1 550.1     Total Intake(mL/kg) 1000.1 (19.2) 2985.1 (57.2) 35 (0.7)    Net +1000.1 +2985.1 +35           Urine Occurrence 4 x 5 x     Stool  Occurrence 4 x 3 x             Physical Exam  Vitals reviewed.   Constitutional:       Comments: Cachexic   HENT:      Head: Normocephalic.      Mouth/Throat:      Mouth: Mucous membranes are moist.   Cardiovascular:      Rate and Rhythm: Normal rate.   Pulmonary:      Effort: Pulmonary effort is normal. No respiratory distress.   Abdominal:      General: There is no distension.      Palpations: Abdomen is soft.      Tenderness: There is no abdominal tenderness.      Comments: No erythema or drainage at the PEG site.  Clean dressing intact.   Skin:     General: Skin is warm and dry.      Comments: Small wounds of the ventral and lateral left foot have granulated tissue at the base without purulent drainage   Neurological:      Mental Status: Mental status is at baseline.      Comments: Aphasic, does not follow commands       Significant Labs:  I have reviewed all pertinent lab results within the past 24 hours.    Significant Diagnostics:  I have reviewed all pertinent imaging results/findings within the past 24 hours.

## 2023-02-06 NOTE — PROGRESS NOTES
[facility]  [facility] Medicine  Progress Note    Patient Name: Juliet Arboleda  MRN: [id number]  Patient Class: IP- Inpatient   Admission Date: 1/31/2023  Length of Stay: 5 days  Attending Physician: Jerrod Almanza MD  Primary Care Provider: Primary Doctor No        Subjective:     Principal Problem:Failure to thrive in adult        HPI:  89 year old male was brought to the ED at [facility] due to weakness and foul smelling wound of the right foot. Patient is non verbal, has dementia and is bed bound therefor the Hx is provided by the daughter who is the care giver.  Patient started having mucus built up and spitting up at home 4-5 days ago, requiring suction. He started having weakness, decrease appetite during this time and skipping meals until for the past 2 days he stopped eating completely therefore the daughter decided to bring him into the ED. Daughter denies any vomiting, tenderness in the abdomen, diarrhea, abnormalities with the urine but reports patient is not at his baseline and is less responsive and more confuse than usual.    Patient has 4 wounds, one sacral wound and one on the left hip/left buttock that are not draining and do not look infectious. He also has 2 wounds on the left foot. One wound is on the dorsal surface of the foot and is not draining but the wound at the base of the 5th metatarsal/lateral aspect of the foot is draining purulent fluid, is tender to touch and is foul smelling. Daughter reports patient receives wound care at [facility] and has received IV antibiotics in the past because of the foot wounds.      Patient was at a normal level of functioning until 2016 when he was diagnosed with Parkinson and then started having dementia gradually. Patient was able to ambulate until 2018 when he had a huge decline and a fall then he became bed bound. He currently is non verbal at home. Patient has chronic constipation and uses enema every 6 days. 2 days ago was the  last time enema was used and BM was very small in volume. Daughter reports they have been considering a PEG tube for the patient and have been in touch with a surgeon in [place] regarding PEG.    ED course:   Xray of the left foot: Erosion at the base of the 5th metatarsal may reflect osteomyelitis.  However, no comparisons available to determine the chronicity of this finding.  Sodium was 154 with Cl 116.   Cr GFR and liver enzymes wnl  Troponin, BNP normal  WBC is not elevated but H/H os 10.2/34.8        Overview/Hospital Course:  02/02 To get PEG. Pt awake. Very cachectic. Start feeding afterwards. To swb. Supportive care.  02/03 Had to have open gastrectomy. Feeding to start slowly in am. Monitor. Friend in room.  02/04 No new issues. Feedings to be started. Monitor. Very weak.  02/05 Tolerating feeding so far Friend and daughter in room and answered questions. They still wish to take pt home at [place] and get HH to help.      Interval History:     Review of Systems   Constitutional:  Positive for activity change, appetite change and fatigue. Negative for fever.   HENT:  Positive for trouble swallowing. Negative for congestion and hearing loss.    Respiratory:  Negative for chest tightness, shortness of breath and wheezing.    Cardiovascular:  Negative for chest pain and palpitations.   Gastrointestinal:  Negative for abdominal pain, constipation and nausea.   Genitourinary:  Negative for difficulty urinating and dysuria.   Musculoskeletal:  Negative for back pain and neck stiffness.   Skin:  Negative for pallor and rash.   Neurological:  Positive for speech difficulty. Negative for dizziness and headaches.   Psychiatric/Behavioral:  Positive for confusion. Negative for suicidal ideas.    Objective:     Vital Signs (Most Recent):  Temp: 96.9 °F (36.1 °C) (02/05/23 1700)  Pulse: 60 (02/05/23 1700)  Resp: 16 (02/05/23 1700)  BP: 129/64 (02/05/23 1700)  SpO2: 98 % (02/05/23 1700)   Vital Signs (24h Range):  Temp:   [96.9 °F (36.1 °C)-97.8 °F (36.6 °C)] 96.9 °F (36.1 °C)  Pulse:  [58-66] 60  Resp:  [14-18] 16  SpO2:  [97 %-100 %] 98 %  BP: (128-153)/(58-72) 129/64     Weight: 52.2 kg (115 lb)  Body mass index is 17.49 kg/m².    Intake/Output Summary (Last 24 hours) at 2/5/2023 2201  Last data filed at 2/5/2023 1833  Gross per 24 hour   Intake 3635.05 ml   Output --   Net 3635.05 ml        Physical Exam  Vitals reviewed.   Constitutional:       General: He is awake. He is not in acute distress.     Appearance: He is well-developed. He is cachectic. He is not toxic-appearing.   HENT:      Head: Normocephalic.      Nose: Nose normal.      Mouth/Throat:      Pharynx: Oropharynx is clear.   Eyes:      Extraocular Movements: Extraocular movements intact.      Pupils: Pupils are equal, round, and reactive to light.   Neck:      Thyroid: No thyroid mass.      Vascular: No carotid bruit.   Cardiovascular:      Rate and Rhythm: Normal rate and regular rhythm.      Pulses: Normal pulses.      Heart sounds: Normal heart sounds. No murmur heard.  Pulmonary:      Effort: Pulmonary effort is normal.      Breath sounds: Normal breath sounds and air entry. No wheezing.   Abdominal:      General: Bowel sounds are normal. There is no distension.      Palpations: Abdomen is soft.      Tenderness: There is no abdominal tenderness.   Musculoskeletal:         General: Normal range of motion.      Cervical back: Neck supple. No rigidity.   Skin:     General: Skin is warm.      Coloration: Skin is not jaundiced.      Findings: No lesion.   Neurological:      General: No focal deficit present.      Mental Status: He is alert. He is disoriented.      Cranial Nerves: No cranial nerve deficit.   Psychiatric:         Attention and Perception: Attention normal.         Behavior: Behavior is cooperative.         Cognition and Memory: Cognition normal.      Comments: Not talking or following commands       Significant Labs: All pertinent labs within the past  24 hours have been reviewed.  BMP:   Recent Labs   Lab 02/04/23  0004   BUN 8   CREATININE 0.93       CBC:   No results for input(s): WBC, HGB, HCT, PLT in the last 48 hours.    CMP:   Recent Labs   Lab 02/04/23  0004   BUN 8   CREATININE 0.93         Significant Imaging: I have reviewed all pertinent imaging results/findings within the past 24 hours.      Assessment/Plan:      * Failure to thrive in adult    Cachectic state to start enteral feeding per peg    Anemia of chronic disease    H/H 10.2/34.8 with MCV if 91.6  Most likley anemia of chronic disease  Iron studies, B12 and folate are pending to r/o other causes of anemia    HLD (hyperlipidemia)    Home Crestor was changed to lipitor 40 mg daily     Dementia associated with Parkinson's disease    Continue home meds carbidopa-levodopa, donepezol and memantine    Cachexia    BMI of 17.49  Patient is unable to feed himself and daughter is the caregiver   Patient can only have Puree food.   Family has considered PEG tube and had an appointment today at [place] to consult a surgeon regarding PEG- could not go to the appointment since they came to the ED  Patient has not ate anything in the last 2 days, has had decrease oral intake in the last 5 days  D5 half normal saline with rate of 75 cc/hr  refeeding syndrome precautions - high dose Thiamine IV ordered daily  Phosphate levels pending      Osteomyelitis of left foot    Xray: Erosion at the base of the 5th metatarsal may reflect osteomyelitis.  However, no comparisons available to determine the chronicity of this finding.  Wound culture of the latera wound at the base of the 5th metatarsal is pending  BC pending  Vanc and zosyn  Fluid maintenance   Surgery consulted  Wound care consulted   NPO  ESR, CRP, Lactic acid, Chest xray pending and UA is pending       VTE Risk Mitigation (From admission, onward)         Ordered     IP VTE HIGH RISK PATIENT  Once         01/31/23 2319     Place sequential compression  device  Until discontinued         01/31/23 2319     Reason for No Pharmacological VTE Prophylaxis  Once        Question:  Reasons:  Answer:  Physician Provided (leave comment)  Comment:  possible surgery tomorrow    01/31/23 2319                Discharge Planning   ROB: 2/3/2023     Code Status: Full Code   Is the patient medically ready for discharge?:     Reason for patient still in hospital (select all that apply): Laboratory test, Treatment and Imaging  Discharge Plan A: Home Health                  Jerrod Almanza MD  Department of Hospital Medicine   [facility]

## 2023-02-06 NOTE — PLAN OF CARE
Problem: Impaired Wound Healing  Goal: Optimal Wound Healing  Outcome: Ongoing, Progressing     Problem: Skin Injury Risk Increased  Goal: Skin Health and Integrity  2/5/2023 1842 by Genaro Stuart RN  Outcome: Ongoing, Progressing  2/5/2023 1841 by Genaro Stuart RN  Outcome: Ongoing, Progressing     Problem: Fall Injury Risk  Goal: Absence of Fall and Fall-Related Injury  Outcome: Ongoing, Progressing

## 2023-02-07 VITALS
BODY MASS INDEX: 17.43 KG/M2 | SYSTOLIC BLOOD PRESSURE: 144 MMHG | WEIGHT: 115 LBS | DIASTOLIC BLOOD PRESSURE: 47 MMHG | OXYGEN SATURATION: 97 % | HEART RATE: 66 BPM | RESPIRATION RATE: 16 BRPM | HEIGHT: 68 IN | TEMPERATURE: 99 F

## 2023-02-07 LAB
BASOPHILS # BLD AUTO: 0.01 K/UL (ref 0–0.2)
BASOPHILS NFR BLD AUTO: 0.3 % (ref 0–1)
DIFFERENTIAL METHOD BLD: ABNORMAL
EOSINOPHIL # BLD AUTO: 0.1 K/UL (ref 0–0.5)
EOSINOPHIL NFR BLD AUTO: 2.9 % (ref 1–4)
ERYTHROCYTE [DISTWIDTH] IN BLOOD BY AUTOMATED COUNT: 16.2 % (ref 11.5–14.5)
HCT VFR BLD AUTO: 30.8 % (ref 40–54)
HGB BLD-MCNC: 9.7 G/DL (ref 13.5–18)
IMM GRANULOCYTES # BLD AUTO: 0.08 K/UL (ref 0–0.04)
IMM GRANULOCYTES NFR BLD: 2.4 % (ref 0–0.4)
LYMPHOCYTES # BLD AUTO: 0.82 K/UL (ref 1–4.8)
LYMPHOCYTES NFR BLD AUTO: 24.1 % (ref 27–41)
MCH RBC QN AUTO: 27.6 PG (ref 27–31)
MCHC RBC AUTO-ENTMCNC: 31.5 G/DL (ref 32–36)
MCV RBC AUTO: 87.7 FL (ref 80–96)
MONOCYTES # BLD AUTO: 0.54 K/UL (ref 0–0.8)
MONOCYTES NFR BLD AUTO: 15.9 % (ref 2–6)
MPC BLD CALC-MCNC: 11.3 FL (ref 9.4–12.4)
NEUTROPHILS # BLD AUTO: 1.85 K/UL (ref 1.8–7.7)
NEUTROPHILS NFR BLD AUTO: 54.4 % (ref 53–65)
NRBC # BLD AUTO: 0 X10E3/UL
NRBC, AUTO (.00): 0 %
PLATELET # BLD AUTO: 100 K/UL (ref 150–400)
RBC # BLD AUTO: 3.51 M/UL (ref 4.6–6.2)
WBC # BLD AUTO: 3.4 K/UL (ref 4.5–11)

## 2023-02-07 PROCEDURE — 85025 COMPLETE CBC W/AUTO DIFF WBC: CPT | Performed by: HOSPITALIST

## 2023-02-07 PROCEDURE — 25000003 PHARM REV CODE 250: Performed by: HOSPITALIST

## 2023-02-07 RX ORDER — CARBIDOPA AND LEVODOPA 25; 100 MG/1; MG/1
1 TABLET ORAL DAILY
Qty: 30 TABLET | Refills: 11 | Status: SHIPPED | OUTPATIENT
Start: 2023-02-08 | End: 2024-02-08

## 2023-02-07 RX ORDER — LACTOBACILLUS ACIDOPHILUS 500MM CELL
2 CAPSULE ORAL
Qty: 180 CAPSULE | Refills: 11 | Status: SHIPPED | OUTPATIENT
Start: 2023-02-07 | End: 2024-02-07

## 2023-02-07 RX ORDER — ACETIC ACID 0.25 G/100ML
IRRIGANT IRRIGATION DAILY
Qty: 1000 ML | Refills: 0 | Status: SHIPPED | OUTPATIENT
Start: 2023-02-07

## 2023-02-07 RX ORDER — SULFAMETHOXAZOLE AND TRIMETHOPRIM 200; 40 MG/5ML; MG/5ML
20 SUSPENSION ORAL EVERY 12 HOURS
Qty: 1120 ML | Refills: 0 | Status: SHIPPED | OUTPATIENT
Start: 2023-02-07 | End: 2023-03-07

## 2023-02-07 RX ORDER — MEMANTINE HYDROCHLORIDE 10 MG/1
10 TABLET ORAL DAILY
Qty: 30 TABLET | Refills: 11 | Status: SHIPPED | OUTPATIENT
Start: 2023-02-08 | End: 2024-02-08

## 2023-02-07 RX ORDER — POLYETHYLENE GLYCOL 3350 17 G/17G
17 POWDER, FOR SOLUTION ORAL DAILY
Qty: 30 EACH | Refills: 11 | Status: SHIPPED | OUTPATIENT
Start: 2023-02-08

## 2023-02-07 RX ORDER — DONEPEZIL HYDROCHLORIDE 10 MG/1
10 TABLET, FILM COATED ORAL DAILY
Qty: 30 TABLET | Refills: 11 | Status: SHIPPED | OUTPATIENT
Start: 2023-02-08 | End: 2024-02-08

## 2023-02-07 RX ORDER — ATORVASTATIN CALCIUM 40 MG/1
40 TABLET, FILM COATED ORAL DAILY
Qty: 90 TABLET | Refills: 3 | Status: SHIPPED | OUTPATIENT
Start: 2023-02-08 | End: 2024-02-08

## 2023-02-07 RX ADMIN — Medication 2 CAPSULE: at 08:02

## 2023-02-07 RX ADMIN — SULFAMETHOXAZOLE AND TRIMETHOPRIM 20 ML: 200; 40 SUSPENSION ORAL at 08:02

## 2023-02-07 RX ADMIN — MEMANTINE 10 MG: 10 TABLET ORAL at 08:02

## 2023-02-07 RX ADMIN — CARBIDOPA AND LEVODOPA 1 TABLET: 25; 100 TABLET ORAL at 08:02

## 2023-02-07 RX ADMIN — ATORVASTATIN CALCIUM 40 MG: 40 TABLET, FILM COATED ORAL at 08:02

## 2023-02-07 RX ADMIN — DONEPEZIL HYDROCHLORIDE 10 MG: 5 TABLET, FILM COATED ORAL at 08:02

## 2023-02-07 NOTE — DISCHARGE SUMMARY
[facility]  [facility] Medicine  Discharge Summary      Patient Name: Juliet Arboleda  MRN: [id number]  SALLY: [id number]  Patient Class: IP- Inpatient  Admission Date: 1/31/2023  Hospital Length of Stay: 7 days  Discharge Date and Time:  02/07/2023 9:29 AM  Attending Physician: Lisa Heller DO   Discharging Provider: SABINO Ashley  Primary Care Provider: Primary Doctor Mi    Primary Care Team: Networked reference to record PCT     HPI:   89 year old male was brought to the ED at [facility] due to weakness and foul smelling wound of the right foot. Patient is non verbal, has dementia and is bed bound therefor the Hx is provided by the daughter who is the care giver.  Patient started having mucus built up and spitting up at home 4-5 days ago, requiring suction. He started having weakness, decrease appetite during this time and skipping meals until for the past 2 days he stopped eating completely therefore the daughter decided to bring him into the ED. Daughter denies any vomiting, tenderness in the abdomen, diarrhea, abnormalities with the urine but reports patient is not at his baseline and is less responsive and more confuse than usual.    Patient has 4 wounds, one sacral wound and one on the left hip/left buttock that are not draining and do not look infectious. He also has 2 wounds on the left foot. One wound is on the dorsal surface of the foot and is not draining but the wound at the base of the 5th metatarsal/lateral aspect of the foot is draining purulent fluid, is tender to touch and is foul smelling. Daughter reports patient receives wound care at [facility] and has received IV antibiotics in the past because of the foot wounds.      Patient was at a normal level of functioning until 2016 when he was diagnosed with Parkinson and then started having dementia gradually. Patient was able to ambulate until 2018 when he had a huge decline and a fall then he became bed bound. He  currently is non verbal at home. Patient has chronic constipation and uses enema every 6 days. 2 days ago was the last time enema was used and BM was very small in volume. Daughter reports they have been considering a PEG tube for the patient and have been in touch with a surgeon in [place] regarding PEG.    ED course:   Xray of the left foot: Erosion at the base of the 5th metatarsal may reflect osteomyelitis.  However, no comparisons available to determine the chronicity of this finding.  Sodium was 154 with Cl 116.   Cr GFR and liver enzymes wnl  Troponin, BNP normal  WBC is not elevated but H/H os 10.2/34.8        Procedure(s) (LRB):  EGD, (N/A)  DEBRIDEMENT, FOOT (Left)  GASTROSTOMY (N/A)      Hospital Course:   02/02 To get PEG. Pt awake. Very cachectic. Start feeding afterwards. To b. Supportive care.  02/03 Had to have open gastrectomy. Feeding to start slowly in am. Monitor. Friend in room.  02/04 No new issues. Feedings to be started. Monitor. Very weak.  02/05 Tolerating feeding so far Friend and daughter in room and answered questions. They still wish to take pt home at [place] and get HH to help.  02/06 Tolerating feeding. Surgery now Ok discharge. Family wishes to take home with home health. Talked to pt friend and pt had recently taking Bactrim. He was noted to have a pressure sore not noted before and that was why she thought was a drug reaction but clearly not. Not clearly osteomyelitis but Bactrim would cover. Will treat with bactrim 4 weeks.  setup HH for home to help family care for PEG tube.  02/07--no new issues or concerns, tolerating feedings.  Has appointment this AM in [place].  Med rec completed per attending.  Stable for discharge.       Goals of Care Treatment Preferences:  Code Status: Full Code      Consults:   Consults (From admission, onward)        Status Ordering Provider     Inpatient consult to Social Work  Once        Provider:  (Not yet assigned)    Acknowledged SHOLA SEGAL  C          * Failure to thrive in adult    Cachectic state to start enteral feeding per peg    02/07--d/c with bolus feedings     Anemia of chronic disease    H/H 10.2/34.8 with MCV if 91.6  Most litzyley anemia of chronic disease  Iron studies, B12 and folate are pending to r/o other causes of anemia    HLD (hyperlipidemia)    Home Crestor was changed to lipitor 40 mg daily     Dementia associated with Parkinson's disease    Continue home meds carbidopa-levodopa, donepezol and memantine    Cachexia    BMI of 17.49  Patient is unable to feed himself and daughter is the caregiver   Patient can only have Puree food.   Family has considered PEG tube and had an appointment today at [place] to consult a surgeon regarding PEG- could not go to the appointment since they came to the ED  Patient has not ate anything in the last 2 days, has had decrease oral intake in the last 5 days  D5 half normal saline with rate of 75 cc/hr  refeeding syndrome precautions - high dose Thiamine IV ordered daily  Phosphate levels pending      02/07--tolerating feedings, will d/c with bolus feedings.  See RD recommendations       Final Active Diagnoses:    Diagnosis Date Noted POA    PRINCIPAL PROBLEM:  Failure to thrive in adult [R62.7] 02/01/2023 Yes    Cachexia [R64] 02/01/2023 Yes    Dementia associated with Parkinson's disease [G20, F02.80] 02/01/2023 Yes    HLD (hyperlipidemia) [E78.5] 02/01/2023 Yes    Anemia of chronic disease [D63.8] 02/01/2023 Yes      Problems Resolved During this Admission:       Discharged Condition: stable    Disposition: Home or Self Care    Follow Up:   Contact information for follow-up providers     Camilo Barton MD. Go today.    Specialty: Family Medicine  Why: Already scheduled  Contact information:  [address]  [facility] [place] [address] [phone]                   Contact information for after-discharge care     Home Medical Care     [facility] .     Service: Home Health Services  Contact information:  [address] [place] [address]  [phone]                             Patient Instructions:      Diet NPO     Notify your health care provider if you experience any of the following:  temperature >100.4     Notify your health care provider if you experience any of the following:  persistent nausea and vomiting or diarrhea     Notify your health care provider if you experience any of the following:  severe uncontrolled pain     Notify your health care provider if you experience any of the following:  redness, tenderness, or signs of infection (pain, swelling, redness, odor or green/yellow discharge around incision site)     Notify your health care provider if you experience any of the following:  difficulty breathing or increased cough     Notify your health care provider if you experience any of the following:  severe persistent headache     Notify your health care provider if you experience any of the following:  worsening rash     Notify your health care provider if you experience any of the following:  persistent dizziness, light-headedness, or visual disturbances     Notify your health care provider if you experience any of the following:  increased confusion or weakness     Change dressing (specify)   Order Comments: Dressing change: Cleanse wound with Vashe moistened gauze - let sit for 2 mins;   wipe wound bed with fresh Vashe moistened gauze   Ceanse periwound with fresh Vashe moistened gauze. Pat dry.   Apply skin barrier spray to periwound and any skin in contact with adhesive.   Apply cut-to-fit wound non-adherent gauze to wound bed.   Cut Drawtex x 2 slightly larger than each wound.   Moisten one piece with Vashe. Apply over gauze.   Cover with dry Drawtex   Secure with border foam     Tube Feedings/Formulas   Order Comments: Bolus feedings     Order Specific Question Answer Comments   Select Adult Formula: Other jevity 1.5   Route: Gastrostomy       Activity as tolerated       Significant Diagnostic Studies: Labs:   BMP:   Recent Labs   Lab 02/06/23  0402         K 3.9      CO2 31   BUN 9   CREATININE 0.88   CALCIUM 8.2*   MG 2.0    and CBC   Recent Labs   Lab 02/06/23 0402 02/07/23  0505   WBC 3.06* 3.40*   HGB 9.8* 9.7*   HCT 31.6* 30.8*   * 100*       Pending Diagnostic Studies:     Procedure Component Value Units Date/Time    EXTRA TUBES [[id number]] Collected: 02/02/23 0720    Order Status: Sent Lab Status: In process Updated: 02/02/23 0734    Specimen: Blood, Venous     Narrative:      The following orders were created for panel order EXTRA TUBES.  Procedure                               Abnormality         Status                     ---------                               -----------         ------                     Lavender Top Hold[[id number]]                                In process                   Please view results for these tests on the individual orders.    EXTRA TUBES [[id number]] Collected: 01/31/23 1900    Order Status: Sent Lab Status: In process Updated: 01/31/23 1904    Specimen: Blood, Venous     Narrative:      The following orders were created for panel order EXTRA TUBES.  Procedure                               Abnormality         Status                     ---------                               -----------         ------                     Light Green Top Hold[[id number]]                             In process                   Please view results for these tests on the individual orders.         Medications:  Reconciled Home Medications:      Medication List      START taking these medications    atorvastatin 40 MG tablet  Commonly known as: LIPITOR  1 tablet (40 mg total) by Per G Tube route once daily.  Start taking on: February 8, 2023     carbidopa-levodopa  mg  mg per tablet  Commonly known as: SINEMET  1 tablet by Per G Tube route once daily.  Start taking on: February 8, 2023      donepeziL 10 MG tablet  Commonly known as: ARICEPT  1 tablet (10 mg total) by Per G Tube route once daily.  Start taking on: February 8, 2023     Lactobacillus acidophilus 500 million cell Cap  2 capsules by Per G Tube route 3 (three) times daily with meals.     memantine 10 MG Tab  Commonly known as: NAMENDA  1 tablet (10 mg total) by Per G Tube route once daily.  Start taking on: February 8, 2023     polyethylene glycol 17 gram Pwpk  Commonly known as: GLYCOLAX  17 g by Per G Tube route once daily.  Start taking on: February 8, 2023     sulfamethoxazole-trimethoprim 200-40 mg/5 ml 200-40 mg/5 mL Susp  Commonly known as: BACTRIM,SEPTRA  20 mLs by Per G Tube route every 12 (twelve) hours.            Indwelling Lines/Drains at time of discharge:   Lines/Drains/Airways     Drain  Duration                Gastrostomy/Enterostomy 02/02/23 1436 LUQ feeding 4 days                Time spent on the discharge of patient: >30 minutes         SABINO Ashley  Department of Hospital Medicine  [facility]

## 2023-02-07 NOTE — PROGRESS NOTES
[facility]  [facility] Medicine  Progress Note    Patient Name: Juliet Arboleda  MRN: [id number]  Patient Class: IP- Inpatient   Admission Date: 1/31/2023  Length of Stay: 6 days  Attending Physician: Jerrod Almanza MD  Primary Care Provider: Primary Doctor No        Subjective:     Principal Problem:Failure to thrive in adult        HPI:  89 year old male was brought to the ED at [facility] due to weakness and foul smelling wound of the right foot. Patient is non verbal, has dementia and is bed bound therefor the Hx is provided by the daughter who is the care giver.  Patient started having mucus built up and spitting up at home 4-5 days ago, requiring suction. He started having weakness, decrease appetite during this time and skipping meals until for the past 2 days he stopped eating completely therefore the daughter decided to bring him into the ED. Daughter denies any vomiting, tenderness in the abdomen, diarrhea, abnormalities with the urine but reports patient is not at his baseline and is less responsive and more confuse than usual.    Patient has 4 wounds, one sacral wound and one on the left hip/left buttock that are not draining and do not look infectious. He also has 2 wounds on the left foot. One wound is on the dorsal surface of the foot and is not draining but the wound at the base of the 5th metatarsal/lateral aspect of the foot is draining purulent fluid, is tender to touch and is foul smelling. Daughter reports patient receives wound care [facility] and has received IV antibiotics in the past because of the foot wounds.      Patient was at a normal level of functioning until 2016 when he was diagnosed with Parkinson and then started having dementia gradually. Patient was able to ambulate until 2018 when he had a huge decline and a fall then he became bed bound. He currently is non verbal at home. Patient has chronic constipation and uses enema every 6 days. 2 days ago was the  last time enema was used and BM was very small in volume. Daughter reports they have been considering a PEG tube for the patient and have been in touch with a surgeon in [place] regarding PEG.    ED course:   Xray of the left foot: Erosion at the base of the 5th metatarsal may reflect osteomyelitis.  However, no comparisons available to determine the chronicity of this finding.  Sodium was 154 with Cl 116.   Cr GFR and liver enzymes wnl  Troponin, BNP normal  WBC is not elevated but H/H os 10.2/34.8        Overview/Hospital Course:  02/02 To get PEG. Pt awake. Very cachectic. Start feeding afterwards. To swb. Supportive care.  02/03 Had to have open gastrectomy. Feeding to start slowly in am. Monitor. Friend in room.  02/04 No new issues. Feedings to be started. Monitor. Very weak.  02/05 Tolerating feeding so far Friend and daughter in room and answered questions. [place] and get HH to help.  02/06 Tolerating feeding. Surgery now Ok discharge. Family wishes to take home with home health. Talked to pt friend and pt had recently taking Bactrim. He was noted to have a pressure sore not noted before and that was why she thought was a drug reaction but clearly not. Not clearly osteomyelitis but Bactrim would cover. Will treat with bactrim 4 weeks. SW setup HH for home to help family care for PEG tube.      Interval History:     Review of Systems   Constitutional:  Positive for activity change, appetite change and fatigue. Negative for fever.   HENT:  Positive for trouble swallowing. Negative for congestion and hearing loss.    Respiratory:  Negative for chest tightness, shortness of breath and wheezing.    Cardiovascular:  Negative for chest pain and palpitations.   Gastrointestinal:  Negative for abdominal pain, constipation and nausea.   Genitourinary:  Negative for difficulty urinating and dysuria.   Musculoskeletal:  Negative for back pain and neck stiffness.   Skin:  Negative for  pallor and rash.   Neurological:  Positive for speech difficulty. Negative for dizziness and headaches.   Psychiatric/Behavioral:  Positive for confusion. Negative for suicidal ideas.    Objective:     Vital Signs (Most Recent):  Temp: 97.3 °F (36.3 °C) (02/06/23 1900)  Pulse: (!) 58 (nurse was notified) (02/06/23 1900)  Resp: 16 (02/06/23 1900)  BP: (!) 181/77 (notified nurse) (02/06/23 1900)  SpO2: 100 % (02/06/23 1900)   Vital Signs (24h Range):  Temp:  [97.3 °F (36.3 °C)-98.2 °F (36.8 °C)] 97.3 °F (36.3 °C)  Pulse:  [58-64] 58  Resp:  [16-18] 16  SpO2:  [96 %-100 %] 100 %  BP: (100-181)/(44-77) 181/77     Weight: 52.2 kg (115 lb)  Body mass index is 17.49 kg/m².    Intake/Output Summary (Last 24 hours) at 2/6/2023 2322  Last data filed at 2/6/2023 1842  Gross per 24 hour   Intake 1885.05 ml   Output --   Net 1885.05 ml        Physical Exam  Vitals reviewed.   Constitutional:       General: He is awake. He is not in acute distress.     Appearance: He is well-developed. He is cachectic. He is not toxic-appearing.   HENT:      Head: Normocephalic.      Nose: Nose normal.      Mouth/Throat:      Pharynx: Oropharynx is clear.   Eyes:      Extraocular Movements: Extraocular movements intact.      Pupils: Pupils are equal, round, and reactive to light.   Neck:      Thyroid: No thyroid mass.      Vascular: No carotid bruit.   Cardiovascular:      Rate and Rhythm: Normal rate and regular rhythm.      Pulses: Normal pulses.      Heart sounds: Normal heart sounds. No murmur heard.  Pulmonary:      Effort: Pulmonary effort is normal.      Breath sounds: Normal breath sounds and air entry. No wheezing.   Abdominal:      General: Bowel sounds are normal. There is no distension.      Palpations: Abdomen is soft.      Tenderness: There is no abdominal tenderness.   Musculoskeletal:         General: Normal range of motion.      Cervical back: Neck supple. No rigidity.   Skin:     General: Skin is warm.      Coloration: Skin is  not jaundiced.      Findings: No lesion.   Neurological:      General: No focal deficit present.      Mental Status: He is alert. He is disoriented.      Cranial Nerves: No cranial nerve deficit.   Psychiatric:         Attention and Perception: Attention normal.         Behavior: Behavior is cooperative.         Cognition and Memory: Cognition normal.      Comments: Not talking or following commands       Significant Labs: All pertinent labs within the past 24 hours have been reviewed.  BMP:   Recent Labs   Lab 02/06/23  0402         K 3.9      CO2 31   BUN 9   CREATININE 0.88   CALCIUM 8.2*   MG 2.0       CBC:   Recent Labs   Lab 02/06/23  0402   WBC 3.06*   HGB 9.8*   HCT 31.6*   *       CMP:   Recent Labs   Lab 02/06/23  0402      K 3.9      CO2 31      BUN 9   CREATININE 0.88   CALCIUM 8.2*   ANIONGAP 9         Significant Imaging: I have reviewed all pertinent imaging results/findings within the past 24 hours.      Assessment/Plan:      * Failure to thrive in adult    Cachectic state to start enteral feeding per peg    Anemia of chronic disease    H/H 10.2/34.8 with MCV if 91.6  Most likley anemia of chronic disease  Iron studies, B12 and folate are pending to r/o other causes of anemia    HLD (hyperlipidemia)    Home Crestor was changed to lipitor 40 mg daily     Dementia associated with Parkinson's disease    Continue home meds carbidopa-levodopa, donepezol and memantine    Cachexia    BMI of 17.49  Patient is unable to feed himself and daughter is the caregiver   Patient can only have Puree food.   Family has considered PEG tube and had an appointment today at [place] to consult a surgeon regarding PEG- could not go to the appointment since they came to the ED  Patient has not ate anything in the last 2 days, has had decrease oral intake in the last 5 days  D5 half normal saline with rate of 75 cc/hr  refeeding syndrome precautions - high dose Thiamine IV ordered  daily  Phosphate levels pending      Osteomyelitis of left foot    Xray: Erosion at the base of the 5th metatarsal may reflect osteomyelitis.  However, no comparisons available to determine the chronicity of this finding.  Wound culture of the latera wound at the base of the 5th metatarsal is pending  BC pending  Vanc and zosyn  Fluid maintenance   Surgery consulted  Wound care consulted   NPO  ESR, CRP, Lactic acid, Chest xray pending and UA is pending     02/06 See above treat with bactrim but not totally sure this is osteomyelitis      VTE Risk Mitigation (From admission, onward)         Ordered     IP VTE HIGH RISK PATIENT  Once         01/31/23 2319     Place sequential compression device  Until discontinued         01/31/23 2319     Reason for No Pharmacological VTE Prophylaxis  Once        Question:  Reasons:  Answer:  Physician Provided (leave comment)  Comment:  possible surgery tomorrow    01/31/23 2319                Discharge Planning   ROB: 2/3/2023     Code Status: Full Code   Is the patient medically ready for discharge?:     Reason for patient still in hospital (select all that apply): Laboratory test, Treatment and Imaging  Discharge Plan A: Home Health                  Jerrod Almanza MD  Department of Hospital Medicine   [facility]

## 2023-02-07 NOTE — NURSING
Instructed patients daughter on tube feedings and how to administer medications through peg tube and informed her that health now will be bringing the feedings to her. Patient asked what would happen if she fed him regular food, I told her patient is not to have anything po due to him being unresponsive and he cannot swallow properly. Daughter expressed hes never had a problem eating before and she couldn't understand why they wouldn't try to feed him. Educated her on reasons why again patient should not take anything by mouth. She stated she understood. No other questions or concerns stated at this time.

## 2023-02-07 NOTE — PROGRESS NOTES
Pharmacy dosing vancomycin 1000 mg IV every 24 hours which resulted in a subtherapeutic trough. We will increase to vancomycin 1000 mg IV every 18 hours today and check a trough 2/9/23 0130. Pharmacy will monitor daily and adjust as necessary.

## 2023-02-07 NOTE — ASSESSMENT & PLAN NOTE
Xray: Erosion at the base of the 5th metatarsal may reflect osteomyelitis.  However, no comparisons available to determine the chronicity of this finding.  Wound culture of the latera wound at the base of the 5th metatarsal is pending  BC pending  Jenniffer and zosyn  Fluid maintenance   Surgery consulted  Wound care consulted   NPO  ESR, CRP, Lactic acid, Chest xray pending and UA is pending     02/06 See above treat with bactrim but not totally sure this is osteomyelitis

## 2023-02-07 NOTE — PLAN OF CARE
Rc'd text from Maulik RN regarding teaching needing to be done with family for tube feeds. Advised RN that they would need to do teaching with family prior to DC. The  nurses will do teaching at home not in hospital. Rc'd a consult last night from nurse regarding this as well. Rcd consult this day for tube feeds. Referral sent to [facility] now. Will follow. And sent recommendation and updates to [facility]. Will follow  1118  Faxed dc summary and avs to [facility] now.  1201  Rc'd call from [facility] they are not in network with [facility]. Called [place] and they are in network. Referral sent now. Per nurse pt left hospital. I did advise them this was not set up prior to leaving as I just rc'd consult.

## 2023-02-07 NOTE — ASSESSMENT & PLAN NOTE
BMI of 17.49  Patient is unable to feed himself and daughter is the caregiver   Patient can only have Puree food.   Family has considered PEG tube and had an appointment today at [place] to consult a surgeon regarding PEG- could not go to the appointment since they came to the ED  Patient has not ate anything in the last 2 days, has had decrease oral intake in the last 5 days  D5 half normal saline with rate of 75 cc/hr  refeeding syndrome precautions - high dose Thiamine IV ordered daily  Phosphate levels pending      02/07--tolerating feedings, will d/c with bolus feedings.  See RD recommendations

## 2023-02-07 NOTE — NURSING
Spoke with ayaka in the pharmacy. She was trying to figure out the vanc situation. She said that the order was modified so she was not sure if the 2000 dose was needed to be given, but she stated that it still needed to be given that she would have that dose brought up

## 2023-02-07 NOTE — SUBJECTIVE & OBJECTIVE
Interval History:     Review of Systems   Constitutional:  Positive for activity change, appetite change and fatigue. Negative for fever.   HENT:  Positive for trouble swallowing. Negative for congestion and hearing loss.    Respiratory:  Negative for chest tightness, shortness of breath and wheezing.    Cardiovascular:  Negative for chest pain and palpitations.   Gastrointestinal:  Negative for abdominal pain, constipation and nausea.   Genitourinary:  Negative for difficulty urinating and dysuria.   Musculoskeletal:  Negative for back pain and neck stiffness.   Skin:  Negative for pallor and rash.   Neurological:  Positive for speech difficulty. Negative for dizziness and headaches.   Psychiatric/Behavioral:  Positive for confusion. Negative for suicidal ideas.    Objective:     Vital Signs (Most Recent):  Temp: 97.3 °F (36.3 °C) (02/06/23 1900)  Pulse: (!) 58 (nurse was notified) (02/06/23 1900)  Resp: 16 (02/06/23 1900)  BP: (!) 181/77 (notified nurse) (02/06/23 1900)  SpO2: 100 % (02/06/23 1900)   Vital Signs (24h Range):  Temp:  [97.3 °F (36.3 °C)-98.2 °F (36.8 °C)] 97.3 °F (36.3 °C)  Pulse:  [58-64] 58  Resp:  [16-18] 16  SpO2:  [96 %-100 %] 100 %  BP: (100-181)/(44-77) 181/77     Weight: 52.2 kg (115 lb)  Body mass index is 17.49 kg/m².    Intake/Output Summary (Last 24 hours) at 2/6/2023 2322  Last data filed at 2/6/2023 1842  Gross per 24 hour   Intake 1885.05 ml   Output --   Net 1885.05 ml        Physical Exam  Vitals reviewed.   Constitutional:       General: He is awake. He is not in acute distress.     Appearance: He is well-developed. He is cachectic. He is not toxic-appearing.   HENT:      Head: Normocephalic.      Nose: Nose normal.      Mouth/Throat:      Pharynx: Oropharynx is clear.   Eyes:      Extraocular Movements: Extraocular movements intact.      Pupils: Pupils are equal, round, and reactive to light.   Neck:      Thyroid: No thyroid mass.      Vascular: No carotid bruit.   Cardiovascular:       Rate and Rhythm: Normal rate and regular rhythm.      Pulses: Normal pulses.      Heart sounds: Normal heart sounds. No murmur heard.  Pulmonary:      Effort: Pulmonary effort is normal.      Breath sounds: Normal breath sounds and air entry. No wheezing.   Abdominal:      General: Bowel sounds are normal. There is no distension.      Palpations: Abdomen is soft.      Tenderness: There is no abdominal tenderness.   Musculoskeletal:         General: Normal range of motion.      Cervical back: Neck supple. No rigidity.   Skin:     General: Skin is warm.      Coloration: Skin is not jaundiced.      Findings: No lesion.   Neurological:      General: No focal deficit present.      Mental Status: He is alert. He is disoriented.      Cranial Nerves: No cranial nerve deficit.   Psychiatric:         Attention and Perception: Attention normal.         Behavior: Behavior is cooperative.         Cognition and Memory: Cognition normal.      Comments: Not talking or following commands       Significant Labs: All pertinent labs within the past 24 hours have been reviewed.  BMP:   Recent Labs   Lab 02/06/23  0402         K 3.9      CO2 31   BUN 9   CREATININE 0.88   CALCIUM 8.2*   MG 2.0       CBC:   Recent Labs   Lab 02/06/23  0402   WBC 3.06*   HGB 9.8*   HCT 31.6*   *       CMP:   Recent Labs   Lab 02/06/23  0402      K 3.9      CO2 31      BUN 9   CREATININE 0.88   CALCIUM 8.2*   ANIONGAP 9         Significant Imaging: I have reviewed all pertinent imaging results/findings within the past 24 hours.

## 2023-02-07 NOTE — CONSULTS
Consult for bolus feeding recommendations due to patient being discharged.    Recommend Jevity 1.5 bolus 1 can 5 x per day with 140ml free water with each bolus feeding 5 x per day.        Recommend Jevity 1.5 bolus 1 can 5 x per day. Will provide 1775cals, 76g pro, 900ml water.     Recommend bolus 140ml free water with each bolus feeding. 700ml free water.

## 2023-02-08 NOTE — PLAN OF CARE
[facility]  Discharge Final Note    Primary Care Provider: Primary Doctor No    Expected Discharge Date: 2/7/2023    Final Discharge Note (most recent)       Final Note - 02/08/23 0847          Final Note    Assessment Type Final Discharge Note     Anticipated Discharge Disposition Home-Health Care Svc        Post-Acute Status    Post-Acute Authorization Home Health     Home Health Status Set-up Complete/Auth obtained     Patient choice form signed by patient/caregiver List with quality metrics by geographic area provided;List from CMS Compare;List from System Post-Acute Care     Discharge Delays None known at this time                     Important Message from Medicare  Important Message from Medicare regarding Discharge Appeal Rights: Given to patient/caregiver     Date IMM was signed: 02/07/23  Time IMM was signed: 1000     Follow-up providers       Camilo Barton MD   Specialty: Family Medicine    [facility]  [address] [place] [address] [place] [address]   Phone: [phone]       Next Steps: Go today    Instructions: Already scheduled              After-discharge care                Home Medical Care       [facility]   Service: Home Health Services    [address] [facility]  [place] [address]   Phone: [phone]                             Pt dc'd ydy. O further needs.

## 2023-02-14 ENCOUNTER — OFFICE VISIT (OUTPATIENT)
Dept: WOUND CARE | Facility: CLINIC | Age: 88
End: 2023-02-14
Attending: FAMILY MEDICINE
Payer: MEDICARE

## 2023-02-14 DIAGNOSIS — L89.890 PRESSURE INJURY OF DORSUM OF LEFT FOOT, UNSTAGEABLE: ICD-10-CM

## 2023-02-14 DIAGNOSIS — L89.892 PRESSURE ULCER OF DORSUM OF LEFT FOOT, STAGE 2: ICD-10-CM

## 2023-02-14 DIAGNOSIS — L89.153 PRESSURE INJURY OF SACRAL REGION, STAGE 3: ICD-10-CM

## 2023-02-14 DIAGNOSIS — L89.223 PRESSURE INJURY OF LEFT HIP, STAGE 3: Primary | ICD-10-CM

## 2023-02-14 PROCEDURE — 3288F PR FALLS RISK ASSESSMENT DOCUMENTED: ICD-10-PCS | Mod: CPTII,,, | Performed by: NURSE PRACTITIONER

## 2023-02-14 PROCEDURE — 1111F DSCHRG MED/CURRENT MED MERGE: CPT | Mod: CPTII,,, | Performed by: NURSE PRACTITIONER

## 2023-02-14 PROCEDURE — 99214 PR OFFICE/OUTPT VISIT, EST, LEVL IV, 30-39 MIN: ICD-10-PCS | Mod: S$PBB,24,, | Performed by: NURSE PRACTITIONER

## 2023-02-14 PROCEDURE — 1101F PR PT FALLS ASSESS DOC 0-1 FALLS W/OUT INJ PAST YR: ICD-10-PCS | Mod: CPTII,,, | Performed by: NURSE PRACTITIONER

## 2023-02-14 PROCEDURE — 1126F AMNT PAIN NOTED NONE PRSNT: CPT | Mod: CPTII,,, | Performed by: NURSE PRACTITIONER

## 2023-02-14 PROCEDURE — 3288F FALL RISK ASSESSMENT DOCD: CPT | Mod: CPTII,,, | Performed by: NURSE PRACTITIONER

## 2023-02-14 PROCEDURE — 1101F PT FALLS ASSESS-DOCD LE1/YR: CPT | Mod: CPTII,,, | Performed by: NURSE PRACTITIONER

## 2023-02-14 PROCEDURE — 1126F PR PAIN SEVERITY QUANTIFIED, NO PAIN PRESENT: ICD-10-PCS | Mod: CPTII,,, | Performed by: NURSE PRACTITIONER

## 2023-02-14 PROCEDURE — 1160F PR REVIEW ALL MEDS BY PRESCRIBER/CLIN PHARMACIST DOCUMENTED: ICD-10-PCS | Mod: CPTII,,, | Performed by: NURSE PRACTITIONER

## 2023-02-14 PROCEDURE — 1160F RVW MEDS BY RX/DR IN RCRD: CPT | Mod: CPTII,,, | Performed by: NURSE PRACTITIONER

## 2023-02-14 PROCEDURE — 1111F PR DISCHARGE MEDS RECONCILED W/ CURRENT OUTPATIENT MED LIST: ICD-10-PCS | Mod: CPTII,,, | Performed by: NURSE PRACTITIONER

## 2023-02-14 PROCEDURE — 1159F MED LIST DOCD IN RCRD: CPT | Mod: CPTII,,, | Performed by: NURSE PRACTITIONER

## 2023-02-14 PROCEDURE — 99213 OFFICE O/P EST LOW 20 MIN: CPT | Mod: PBBFAC | Performed by: NURSE PRACTITIONER

## 2023-02-14 PROCEDURE — 99214 OFFICE O/P EST MOD 30 MIN: CPT | Mod: S$PBB,24,, | Performed by: NURSE PRACTITIONER

## 2023-02-14 PROCEDURE — 1159F PR MEDICATION LIST DOCUMENTED IN MEDICAL RECORD: ICD-10-PCS | Mod: CPTII,,, | Performed by: NURSE PRACTITIONER

## 2023-02-14 RX ORDER — SILVER SULFADIAZINE 10 G/1000G
CREAM TOPICAL DAILY
Qty: 400 G | Refills: 1 | Status: SHIPPED | OUTPATIENT
Start: 2023-02-14

## 2023-02-14 RX ORDER — COLLAGENASE SANTYL 250 [ARB'U]/G
OINTMENT TOPICAL
COMMUNITY

## 2023-02-14 NOTE — PATIENT INSTRUCTIONS
Clean all wounds with baby shampoo and water  Left ant foot:  Apply mepitel, then a nickel thick layer of Santyl ointment.  Cover with 4x4s, kerlix  Change daily and as needed    Left lat foot, Left hip, Sacrum  Apply polymem silver  Cover with bordered foam  Change every other day and as needed     Vitamins:  Take vitamin C 1000 mg, zinc 50mg, vitamin d 5000 units, and a daily multivitamin. Angel is a good source of protein and nutrients to aid in wound healing.   Monitor closely for s/s of infection including fever, chills, increase in pain, odor from wound, and increased redness from foot. Go to ER if any complications develop.   Keep leg elevated and avoid pressure on wound.  Turn every 2 hours while in bed  Home Health to assist with care

## 2023-02-14 NOTE — PROGRESS NOTES
SABINO Almeida   [facility]  [address] [facility] [address]  [phone]      PATIENT NAME: Juliet Arboleda  : 1933  DATE: 23  MRN: [id number]      Billing Provider: SABINO Almeida  Level of Service:   Patient PCP Information       Provider PCP Type    Primary Doctor No General            Reason for Visit / Chief Complaint: Pressure Ulcer (sacrum, left hip,left foot) and Non-healing Wound (Left foot)       History of Present Illness / Problem Focused Workflow     Juliet Arboleda is a 88 yo male presents to clinic with pressure injuries to left foot, left ischial, and sacral. He was recently hospitalized due with weakness and loss of appetite. He had debridement of left foot and PEG tube placed on . Wound on left medial foot with necrotic tissue and tendon exposure. Unable to obtain vascular studies at this time, enzymatic debridement with santyl at home. Silver polymem to other wounds. Patient is non verbal, has dementia and is bed bound, daughter is at bedside and is primary caregiver.  Pertinent PMH includes Parkinson's disease, hyperlipidemia, and dementia. Wound healing is complicated by  multiple co-morbidities, poor vascular supply, necrosis, advanced age, fragile skin, no protective sensation, and infection.               Review of Systems     Review of Systems   Unable to perform ROS: Patient nonverbal     Medical / Social / Family History     Past Medical History:   Diagnosis Date    Parkinson's disease        Past Surgical History:   Procedure Laterality Date    DEBRIDEMENT OF FOOT Left 2023    Procedure: DEBRIDEMENT, FOOT;  Surgeon: David Lutz MD;  Location: [facility] OR;  Service: General;  Laterality: Left;    ESOPHAGOGASTRODUODENOSCOPY W/ PEG N/A 2023    Procedure: EGD,;  Surgeon: David Lutz MD;  Location: [facility] OR;  Service: General;  Laterality: N/A;    GASTROSTOMY N/A 2023    Procedure:  GASTROSTOMY;  Surgeon: David Lutz MD;  Location: [facility];  Service: General;  Laterality: N/A;    lung nodule removal N/A 1990       Social History  Mr. Juliet Arboleda  reports that he has quit smoking. His smoking use included cigarettes. He has never used smokeless tobacco. He reports that he does not currently use alcohol.    Family History  's Juliet Arboleda family history includes No Known Problems in his father and mother.    Medications and Allergies     Medications  Outpatient Medications Marked as Taking for the 2/14/23 encounter (Office Visit) with SABINO Almeida   Medication Sig Dispense Refill    acetic acid 0.25 % irrigation Irrigate with as directed once daily. Apply to dressing that directly covers wound daily 1000 mL 0    atorvastatin (LIPITOR) 40 MG tablet 1 tablet (40 mg total) by Per G Tube route once daily. 90 tablet 3    carbidopa-levodopa  mg (SINEMET)  mg per tablet 1 tablet by Per G Tube route once daily. 30 tablet 11    collagenase (SANTYL) ointment Santyl 250 unit/gram topical ointment   APPLY TO WOUNDS ONCE DAILY AS DIRECTED      donepeziL (ARICEPT) 10 MG tablet 1 tablet (10 mg total) by Per G Tube route once daily. 30 tablet 11    Lactobacillus acidophilus 500 million cell Cap 2 capsules by Per G Tube route 3 (three) times daily with meals. 180 capsule 11    memantine (NAMENDA) 10 MG Tab 1 tablet (10 mg total) by Per G Tube route once daily. 30 tablet 11    polyethylene glycol (GLYCOLAX) 17 gram PwPk 17 g by Per G Tube route once daily. 30 each 11    silver sulfADIAZINE 1% (SILVADENE) 1 % cream Apply topically once daily. 400 g 1    sulfamethoxazole-trimethoprim 200-40 mg/5 ml (BACTRIM,SEPTRA) 200-40 mg/5 mL Susp 20 mLs by Per G Tube route every 12 (twelve) hours. 1120 mL 0       Allergies  Review of patient's allergies indicates:  No Known Allergies    Physical Examination   There were no vitals filed for this visit.  Physical Exam  Vitals and  nursing note reviewed.   Constitutional:       Appearance: He is ill-appearing.      Comments: Chronically ill, emaciated, contracted   HENT:      Head: Normocephalic.   Cardiovascular:      Rate and Rhythm: Normal rate and regular rhythm.      Pulses: Normal pulses.      Heart sounds: Normal heart sounds.   Pulmonary:      Breath sounds: Rales present.   Musculoskeletal:         General: Swelling and tenderness present.      Left lower leg: Edema present.   Skin:     General: Skin is warm and dry.      Findings: Erythema present.      Comments: See LDA for photos/measurements   Neurological:      Mental Status: He is alert. Mental status is at baseline.   Psychiatric:         Mood and Affect: Mood normal.         Behavior: Behavior normal.         Thought Content: Thought content normal.         Judgment: Judgment normal.     Assessment and Plan             Altered Skin Integrity 02/01/23 0930 Left anterior Foot #2 Ulceration Full thickness tissue loss. Base is covered by slough and/or eschar in the wound bed (Active)   02/01/23 0930   Altered Skin Integrity Present on Admission: yes   Side: Left   Orientation: anterior   Location: Foot   Wound Number: #2   Is this injury device related?: No   Primary Wound Type: Ulceration   Description of Altered Skin Integrity: Full thickness tissue loss. Base is covered by slough and/or eschar in the wound bed   Ankle-Brachial Index:    Pulses:    Removal Indication and Assessment:    Wound Outcome:    (Retired) Wound Length (cm):    (Retired) Wound Width (cm):    (Retired) Depth (cm):    Wound Description (Comments):    Removal Indications:    Wound Image   02/14/23 7537            Altered Skin Integrity Left anterior Foot #3 Ulceration Partial thickness tissue loss. Shallow open ulcer with a red or pink wound bed, without slough. Intact or Open/Ruptured Serum-filled blister. (Active)       Altered Skin Integrity Present on Admission: yes   Side: Left   Orientation: anterior    Location: Foot   Wound Number: #3   Is this injury device related?: No   Primary Wound Type: Ulceration   Description of Altered Skin Integrity: Partial thickness tissue loss. Shallow open ulcer with a red or pink wound bed, without slough. Intact or Open/Ruptured Serum-filled blister.   Ankle-Brachial Index:    Pulses:    Removal Indication and Assessment:    Wound Outcome:    (Retired) Wound Length (cm):    (Retired) Wound Width (cm):    (Retired) Depth (cm):    Wound Description (Comments):    Removal Indications:    Wound Image   02/14/23 1457   Wound Length (cm) 1.8 cm 02/14/23 1457   Wound Width (cm) 0.6 cm 02/14/23 1457   Wound Depth (cm) 0.2 cm 02/14/23 1457   Wound Volume (cm^3) 0.216 cm^3 02/14/23 1457   Wound Surface Area (cm^2) 1.08 cm^2 02/14/23 1457            Altered Skin Integrity 02/01/23 0930 medial Sacral spine #4 Full thickness tissue loss. Subcutaneous fat may be visible but bone, tendon or muscle are not exposed (Active)   02/01/23 0930   Altered Skin Integrity Present on Admission: yes   Side:    Orientation: medial   Location: Sacral spine   Wound Number: #4   Is this injury device related?: No   Primary Wound Type:    Description of Altered Skin Integrity: Full thickness tissue loss. Subcutaneous fat may be visible but bone, tendon or muscle are not exposed   Ankle-Brachial Index:    Pulses:    Removal Indication and Assessment:    Wound Outcome:    (Retired) Wound Length (cm):    (Retired) Wound Width (cm):    (Retired) Depth (cm):    Wound Description (Comments):    Removal Indications:    Wound Image   02/14/23 1501   Wound Length (cm) 1.6 cm 02/14/23 1501   Wound Width (cm) 0.5 cm 02/14/23 1501   Wound Depth (cm) 0.2 cm 02/14/23 1501   Wound Volume (cm^3) 0.16 cm^3 02/14/23 1501   Wound Surface Area (cm^2) 0.8 cm^2 02/14/23 1501            Altered Skin Integrity 02/14/23 1459 Left Hip Other (comment) (Active)   02/14/23 1459   Altered Skin Integrity Present on Admission: yes   Side:  Left   Orientation:    Location: Hip   Wound Number:    Is this injury device related?:    Primary Wound Type: Other   Description of Altered Skin Integrity:    Ankle-Brachial Index:    Pulses:    Removal Indication and Assessment:    Wound Outcome:    (Retired) Wound Length (cm):    (Retired) Wound Width (cm):    (Retired) Depth (cm):    Wound Description (Comments):    Removal Indications:    Wound Image    02/14/23 1500   Wound Length (cm) 1.2 cm 02/14/23 1500   Wound Width (cm) 1.7 cm 02/14/23 1500   Wound Depth (cm) 0.2 cm 02/14/23 1500   Wound Volume (cm^3) 0.408 cm^3 02/14/23 1500   Wound Surface Area (cm^2) 2.04 cm^2 02/14/23 1500     Problem List Items Addressed This Visit          Orthopedic    Pressure injury of left hip, stage 3 - Primary    Overview                  Current Assessment & Plan     Clean all wounds with baby shampoo and water  Left ant foot:  Apply mepitel, then a nickel thick layer of Santyl ointment.  Cover with 4x4s, kerlix  Change daily and as needed    Left lat foot, Left hip, Sacrum  Apply polymem silver  Cover with bordered foam  Change every other day and as needed     Vitamins:  Take vitamin C 1000 mg, zinc 50mg, vitamin d 5000 units, and a daily multivitamin. Angel is a good source of protein and nutrients to aid in wound healing.   Monitor closely for s/s of infection including fever, chills, increase in pain, odor from wound, and increased redness from foot. Go to ER if any complications develop.   Keep leg elevated and avoid pressure on wound.  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  Cover with 4x4s, kerlix  Change daily and as needed    Left lat  foot, Left hip, Sacrum  Apply polymem silver  Cover with bordered foam  Change every other day and as needed     Vitamins:  Take vitamin C 1000 mg, zinc 50mg, vitamin d 5000 units, and a daily multivitamin. Angel is a good source of protein and nutrients to aid in wound healing.   Monitor closely for s/s of infection including fever, chills, increase in pain, odor from wound, and increased redness from foot. Go to ER if any complications develop.   Keep leg elevated and avoid pressure on wound.  Turn every 2 hours while in bed  Home Health to assist with care         Pressure injury of left foot, unstageable    Overview                  Current Assessment & Plan     Clean all wounds with baby shampoo and water  Left ant foot:  Apply mepitel, then a nickel thick layer of Santyl ointment.  Cover with 4x4s, kerlix  Change daily and as needed    Left lat foot, Left hip, Sacrum  Apply polymem silver  Cover with bordered foam  Change every other day and as needed     Vitamins:  Take vitamin C 1000 mg, zinc 50mg, vitamin d 5000 units, and a daily multivitamin. Angel is a good source of protein and nutrients to aid in wound healing.   Monitor closely for s/s of infection including fever, chills, increase in pain, odor from wound, and increased redness from foot. Go to ER if any complications develop.   Keep leg elevated and avoid pressure on wound.  Turn every 2 hours while in bed  Home Health to assist with care            Future Appointments   Date Time Provider Department Center   2/20/2023  2:15 PM David Lutz MD [facility]   3/14/2023  1:00 PM [facility]INO Almeida [facility]            Signature:  SABINO Almeida  [facility]  [address]  [place] [address]  [phone]    Date of encounter: 2/14/23

## 2023-02-24 ENCOUNTER — OFFICE VISIT (OUTPATIENT)
Dept: SURGERY | Facility: CLINIC | Age: 88
End: 2023-02-24
Attending: SURGERY
Payer: MEDICARE

## 2023-02-24 VITALS
HEIGHT: 68 IN | RESPIRATION RATE: 18 BRPM | HEART RATE: 72 BPM | DIASTOLIC BLOOD PRESSURE: 60 MMHG | SYSTOLIC BLOOD PRESSURE: 92 MMHG | WEIGHT: 115 LBS | TEMPERATURE: 98 F | BODY MASS INDEX: 17.43 KG/M2

## 2023-02-24 DIAGNOSIS — Z09 POSTOP CHECK: Primary | ICD-10-CM

## 2023-02-24 PROCEDURE — 1101F PR PT FALLS ASSESS DOC 0-1 FALLS W/OUT INJ PAST YR: ICD-10-PCS | Mod: CPTII,,, | Performed by: SURGERY

## 2023-02-24 PROCEDURE — 1126F AMNT PAIN NOTED NONE PRSNT: CPT | Mod: CPTII,,, | Performed by: SURGERY

## 2023-02-24 PROCEDURE — 99214 OFFICE O/P EST MOD 30 MIN: CPT | Mod: PBBFAC | Performed by: SURGERY

## 2023-02-24 PROCEDURE — 3288F PR FALLS RISK ASSESSMENT DOCUMENTED: ICD-10-PCS | Mod: CPTII,,, | Performed by: SURGERY

## 2023-02-24 PROCEDURE — 1159F PR MEDICATION LIST DOCUMENTED IN MEDICAL RECORD: ICD-10-PCS | Mod: CPTII,,, | Performed by: SURGERY

## 2023-02-24 PROCEDURE — 1126F PR PAIN SEVERITY QUANTIFIED, NO PAIN PRESENT: ICD-10-PCS | Mod: CPTII,,, | Performed by: SURGERY

## 2023-02-24 PROCEDURE — 99024 PR POST-OP FOLLOW-UP VISIT: ICD-10-PCS | Mod: ,,, | Performed by: SURGERY

## 2023-02-24 PROCEDURE — 1159F MED LIST DOCD IN RCRD: CPT | Mod: CPTII,,, | Performed by: SURGERY

## 2023-02-24 PROCEDURE — 1101F PT FALLS ASSESS-DOCD LE1/YR: CPT | Mod: CPTII,,, | Performed by: SURGERY

## 2023-02-24 PROCEDURE — 99024 POSTOP FOLLOW-UP VISIT: CPT | Mod: ,,, | Performed by: SURGERY

## 2023-02-24 PROCEDURE — 3288F FALL RISK ASSESSMENT DOCD: CPT | Mod: CPTII,,, | Performed by: SURGERY

## 2023-02-28 NOTE — PROGRESS NOTES
Patient returns for follow-up after undergoing foot debridement and open gastrostomy.  The patients family states that he is taking a small amount of oral intake and that his tube feeds are going satisfactorily.  The family wishes to switch him to bolus feeds.    Abdominal incision looks good.  The PEG site is satisfactory.    Foot wound appears to be healing nicely, no longer containing exposed bone.      Patient instructed to follow-up with me p.r.n. for any problems.

## 2023-03-13 NOTE — PROGRESS NOTES
SABINO Almeida   [facility]  [address] [facility] [address]  [phone]      PATIENT NAME: Juliet Arboleda  : 1933  DATE: 3/14/23  MRN: [id number]      Billing Provider: SABINO Almeida  Level of Service:   Patient PCP Information       Provider PCP Type    Primary Doctor No General            Reason for Visit / Chief Complaint: No chief complaint on file.       History of Present Illness / Problem Focused Workflow     Juliet Arboleda is a 89 yo male presents to clinic with pressure injuries to left foot, left ischial, and sacral. He was recently hospitalized due with weakness and loss of appetite. He had debridement of left foot and PEG tube placed on . Wound on left medial foot with necrotic tissue and tendon exposure. Unable to obtain vascular studies at this time, enzymatic debridement with santyl at home. Silver polymem to other wounds. Patient is non verbal, has dementia and is bed bound, daughter is at bedside and is primary caregiver.  Pertinent PMH includes Parkinson's disease, hyperlipidemia, and dementia. Wound healing is complicated by  multiple co-morbidities, poor vascular supply, necrosis, advanced age, fragile skin, no protective sensation, and infection.             Review of Systems     Review of Systems   Unable to perform ROS: Patient nonverbal     Medical / Social / Family History     Past Medical History:   Diagnosis Date    Parkinson's disease        Past Surgical History:   Procedure Laterality Date    DEBRIDEMENT OF FOOT Left 2023    Procedure: DEBRIDEMENT, FOOT;  Surgeon: David Lutz MD;  Location: [facility] OR;  Service: General;  Laterality: Left;    ESOPHAGOGASTRODUODENOSCOPY W/ PEG N/A 2023    Procedure: EGD,;  Surgeon: David Lutz MD;  Location: [facility] OR;  Service: General;  Laterality: N/A;    GASTROSTOMY N/A 2023    Procedure: GASTROSTOMY;  Surgeon: David Lutz MD;  Location: [facility]  OR;  Service: General;  Laterality: N/A;    lung nodule removal N/A 1990       Social History  Mr. Juliet Arboleda  reports that he has quit smoking. His smoking use included cigarettes. He has never used smokeless tobacco. He reports that he does not currently use alcohol.    Family History  'varun Arboleda family history includes No Known Problems in his father and mother.    Medications and Allergies     Medications  No outpatient medications have been marked as taking for the 3/14/23 encounter (Appointment) with SABINO Almeida.       Allergies  Review of patient's allergies indicates:  No Known Allergies    Physical Examination   There were no vitals filed for this visit.  Physical Exam  Vitals and nursing note reviewed.   Constitutional:       Appearance: He is ill-appearing.      Comments: Chronically ill, emaciated, contracted   HENT:      Head: Normocephalic.   Cardiovascular:      Rate and Rhythm: Normal rate and regular rhythm.      Pulses: Normal pulses.      Heart sounds: Normal heart sounds.   Pulmonary:      Breath sounds: Rales present.   Musculoskeletal:         General: Swelling and tenderness present.      Left lower leg: Edema present.   Skin:     General: Skin is warm and dry.      Findings: Erythema present.      Comments: See LDA for photos/measurements   Neurological:      Mental Status: He is alert. Mental status is at baseline.   Psychiatric:         Mood and Affect: Mood normal.         Behavior: Behavior normal.         Thought Content: Thought content normal.         Judgment: Judgment normal.     Assessment and Plan           Problem List Items Addressed This Visit          Orthopedic    Pressure injury of left hip, stage 3 - Primary    Overview                  Pressure ulcer of dorsum of left foot, stage 2    Overview                  Pressure injury of sacral region, stage 3    Overview                   Other Visit Diagnoses       Pressure injury of dorsum of left foot,  unstageable                Future Appointments   Date Time Provider Department Center   3/14/2023  1:00 PM SABINO Almeida [facility]            Signature:  JAIRON BAZZI LPN  [facility]  [address]  [place] [address]  [phone]    Date of encounter: 3/14/23

## 2023-03-13 NOTE — PATIENT INSTRUCTIONS
Clean all wounds with baby shampoo and water  Left ant foot:  Apply mepitel, then a nickel thick layer of Santyl ointment.  Cover with 4x4s, kerlix  Change daily and as needed     Left lat foot, Left hip, Sacrum  Apply polymem silver  Cover with bordered foam  Change every other day and as needed  Home Health to order hospital bed with low air loss mattress     Monitor closely for s/s of infection including fever, chills, increase in pain, odor from wound, and increased redness from foot. Go to ER if any complications develop.   Keep leg elevated and avoid pressure on wound.   Diabetes:  Monitor glucose closely. Check fasting glucose and 2 hours after meals. HgA1C goal <7, fasting glucose , and 2 hours after meals <180  Hypertension:  Check blood pressure twice daily, goal <120/80  Diet:   Increase protein intake, avoid fried, fatty foods and foods high in simple carbs.   Vitamins:  Take vitamin C 1000 mg, zinc 50mg, vitamin d 5000 units, and a daily multivitamin. Angel is a good source of protein and nutrients to aid in wound healing.

## 2023-03-14 ENCOUNTER — OFFICE VISIT (OUTPATIENT)
Dept: WOUND CARE | Facility: CLINIC | Age: 88
End: 2023-03-14
Attending: FAMILY MEDICINE

## 2023-03-14 VITALS
RESPIRATION RATE: 20 BRPM | HEART RATE: 68 BPM | DIASTOLIC BLOOD PRESSURE: 40 MMHG | TEMPERATURE: 98 F | SYSTOLIC BLOOD PRESSURE: 78 MMHG

## 2023-03-14 DIAGNOSIS — L89.153 PRESSURE INJURY OF SACRAL REGION, STAGE 3: ICD-10-CM

## 2023-03-14 DIAGNOSIS — L89.890 PRESSURE INJURY OF DORSUM OF LEFT FOOT, UNSTAGEABLE: ICD-10-CM

## 2023-03-14 DIAGNOSIS — L89.892 PRESSURE ULCER OF DORSUM OF LEFT FOOT, STAGE 2: ICD-10-CM

## 2023-03-14 DIAGNOSIS — L89.223 PRESSURE INJURY OF LEFT HIP, STAGE 3: Primary | ICD-10-CM

## 2023-03-14 PROCEDURE — 99214 OFFICE O/P EST MOD 30 MIN: CPT | Mod: PBBFAC | Performed by: FAMILY MEDICINE

## 2023-03-14 PROCEDURE — 99214 OFFICE O/P EST MOD 30 MIN: CPT | Mod: S$PBB,,, | Performed by: FAMILY MEDICINE

## 2023-03-14 PROCEDURE — 99214 PR OFFICE/OUTPT VISIT, EST, LEVL IV, 30-39 MIN: ICD-10-PCS | Mod: S$PBB,,, | Performed by: FAMILY MEDICINE

## 2023-03-14 PROCEDURE — 1126F AMNT PAIN NOTED NONE PRSNT: CPT | Mod: CPTII,,, | Performed by: FAMILY MEDICINE

## 2023-03-14 PROCEDURE — 1159F PR MEDICATION LIST DOCUMENTED IN MEDICAL RECORD: ICD-10-PCS | Mod: CPTII,,, | Performed by: FAMILY MEDICINE

## 2023-03-14 PROCEDURE — 1159F MED LIST DOCD IN RCRD: CPT | Mod: CPTII,,, | Performed by: FAMILY MEDICINE

## 2023-03-14 PROCEDURE — 1126F PR PAIN SEVERITY QUANTIFIED, NO PAIN PRESENT: ICD-10-PCS | Mod: CPTII,,, | Performed by: FAMILY MEDICINE

## 2023-03-14 RX ORDER — FEEDER CONTAINER WITH PUMP SET
1 EACH MISCELLANEOUS 2 TIMES DAILY
COMMUNITY

## 2023-03-14 RX ORDER — SULFAMETHOXAZOLE AND TRIMETHOPRIM 200; 40 MG/5ML; MG/5ML
20 SUSPENSION ORAL 2 TIMES DAILY
COMMUNITY

## 2023-03-14 RX ORDER — FLUNISOLIDE 0.25 MG/ML
2 SOLUTION NASAL 2 TIMES DAILY
COMMUNITY

## 2023-03-14 RX ORDER — LACTOSE-REDUCED FOOD/FIBER 0.07 G-1.5
240 LIQUID (ML) ORAL
COMMUNITY

## 2023-03-14 RX ORDER — NEOMYCIN SULFATE, POLYMYXIN B SULFATE AND DEXAMETHASONE 3.5; 10000; 1 MG/ML; [USP'U]/ML; MG/ML
1 SUSPENSION/ DROPS OPHTHALMIC 3 TIMES DAILY
COMMUNITY
Start: 2022-05-17

## 2023-03-14 RX ORDER — ARGININE/GLUTAMINE/CALCIUM BMB 7G-7G-1.5G
1 POWDER IN PACKET (EA) ORAL 2 TIMES DAILY
COMMUNITY

## 2023-03-15 PROBLEM — L89.890: Status: ACTIVE | Noted: 2023-02-14

## 2023-03-15 NOTE — PROGRESS NOTES
Subjective:      Patient ID: Juliet Arboleda is a 89 y.o. male.    Chief Complaint: Non-healing Wound Follow Up (Left hip)    Juliet Arboleda a 89 y.o. male presents for follow up on all regular problems which are reviewed and discussed.     Problem List Items Addressed This Visit          Orthopedic    Pressure injury of left hip, stage 3 - Primary    Overview                  Pressure injury of dorsum of left foot, unstageable    Overview                  Pressure injury of sacral region, stage 3    Overview                     Past Medical History:  Past Medical History:   Diagnosis Date    Parkinson's disease      Past Surgical History:   Procedure Laterality Date    DEBRIDEMENT OF FOOT Left 2/2/2023    Procedure: DEBRIDEMENT, FOOT;  Surgeon: David Lutz MD;  Location: [facility];  Service: General;  Laterality: Left;    ESOPHAGOGASTRODUODENOSCOPY W/ PEG N/A 2/2/2023    Procedure: EGD,;  Surgeon: David Lutz MD;  Location: [facility];  Service: General;  Laterality: N/A;    GASTROSTOMY N/A 2/2/2023    Procedure: GASTROSTOMY;  Surgeon: David Lutz MD;  Location: [facility];  Service: General;  Laterality: N/A;    lung nodule removal N/A 1990     Review of patient's allergies indicates:  No Known Allergies  Current Outpatient Medications on File Prior to Visit   Medication Sig Dispense Refill    neomycin-polymyxin-dexamethasone (MAXITROL) 3.5mg/mL-10,000 unit/mL-0.1 % DrpS Place 1 drop into the left eye 3 (three) times daily.      acetic acid 0.25 % irrigation Irrigate with as directed once daily. Apply to dressing that directly covers wound daily 1000 mL 0    arginine-glutamine-calcium HMB (KALEB) 7-7-1.5 gram PwPk 1 packet by PEG Tube route 2 (two) times daily.      atorvastatin (LIPITOR) 40 MG tablet 1 tablet (40 mg total) by Per G Tube route once daily. 90 tablet 3    carbidopa-levodopa  mg (SINEMET)  mg per tablet 1 tablet by Per G Tube route once daily. 30 tablet 11    collagenase  "(SANTYL) ointment Santyl 250 unit/gram topical ointment   APPLY TO WOUNDS ONCE DAILY AS DIRECTED      donepeziL (ARICEPT) 10 MG tablet 1 tablet (10 mg total) by Per G Tube route once daily. 30 tablet 11    flunisolide 25 mcg, 0.025%, (NASALIDE) 25 mcg (0.025 %) Spry Inhale 2 sprays into the lungs 2 (two) times daily.      food supplemt, lactose-reduced (ENSURE ACTIVE HIGH PROTEIN) Liqd Take 1 Bottle by mouth 2 (two) times daily.      L. acidophilus/L. bifidus (LACTOBACILLUS ACIDOPH & BIFID ORAL) 2 capsules by PEG Tube route 3 (three) times daily.      Lactobacillus acidophilus 500 million cell Cap 2 capsules by Per G Tube route 3 (three) times daily with meals. 180 capsule 11    lactose-reduced food with fibr (ISOSOURCE 1.5 ADDISON) 0.07 gram-1.5 kcal/mL Liqd 240 mLs by Gastrostomy Tube route every 4 to 6 hours as needed.      memantine (NAMENDA) 10 MG Tab 1 tablet (10 mg total) by Per G Tube route once daily. 30 tablet 11    non-adherent bandage 3 X 3 " Bndg Apply 1 application topically once daily.      polyethylene glycol (GLYCOLAX) 17 gram PwPk 17 g by Per G Tube route once daily. 30 each 11    silver sulfADIAZINE 1% (SILVADENE) 1 % cream Apply topically once daily. 400 g 1    sulfamethoxazole-trimethoprim 200-40 mg/5 ml (BACTRIM,SEPTRA) 200-40 mg/5 mL Susp 20 mLs by Per G Tube route 2 (two) times daily.       No current facility-administered medications on file prior to visit.     Social History     Socioeconomic History    Marital status:    Tobacco Use    Smoking status: Former     Types: Cigarettes    Smokeless tobacco: Never   Substance and Sexual Activity    Alcohol use: Not Currently     Comment: Socially    Sexual activity: Not Currently     Social Determinants of Health     Financial Resource Strain: Low Risk     Difficulty of Paying Living Expenses: Not hard at all   Food Insecurity: No Food Insecurity    Worried About Running Out of Food in the Last Year: Never true    Ran Out of Food in the Last " Year: Never true   Transportation Needs: No Transportation Needs    Lack of Transportation (Medical): No    Lack of Transportation (Non-Medical): No   Physical Activity: Inactive    Days of Exercise per Week: 0 days    Minutes of Exercise per Session: 0 min   Stress: No Stress Concern Present    Feeling of Stress : Not at all   Social Connections: Socially Isolated    Frequency of Communication with Friends and Family: Once a week    Frequency of Social Gatherings with Friends and Family: Once a week    Attends Denominational Services: Never    Active Member of Clubs or Organizations: Yes    Attends Club or Organization Meetings: Never    Marital Status:    Housing Stability: Low Risk     Unable to Pay for Housing in the Last Year: No    Number of Places Lived in the Last Year: 1    Unstable Housing in the Last Year: No     Family History   Problem Relation Age of Onset    No Known Problems Mother     No Known Problems Father        Review of Systems   Constitutional: Negative.    HENT:  Negative for congestion, ear pain, nosebleeds and trouble swallowing.    Eyes:  Negative for pain and itching.   Respiratory:  Negative for chest tightness.    Cardiovascular:  Negative for chest pain.   Gastrointestinal:  Negative for abdominal distention.   Endocrine: Negative for cold intolerance and heat intolerance.   Genitourinary:  Negative for difficulty urinating.   Musculoskeletal:  Negative for arthralgias.   Neurological:  Negative for dizziness.     Objective:     BP (!) 78/40   Pulse 68   Temp 97.8 °F (36.6 °C)   Resp 20     Physical Exam  Constitutional:       Appearance: Normal appearance. He is obese.   HENT:      Head: Normocephalic and atraumatic.      Right Ear: External ear normal.      Left Ear: External ear normal.      Nose: Nose normal.      Mouth/Throat:      Mouth: Mucous membranes are moist.      Pharynx: Oropharynx is clear.   Eyes:      Pupils: Pupils are equal, round, and reactive to light.    Cardiovascular:      Rate and Rhythm: Normal rate and regular rhythm.      Heart sounds: Normal heart sounds.   Pulmonary:      Effort: Pulmonary effort is normal.      Breath sounds: Normal breath sounds.   Abdominal:      Palpations: Abdomen is soft.   Musculoskeletal:         General: Normal range of motion.      Cervical back: Normal range of motion and neck supple.   Skin:     General: Skin is warm and dry.      Findings: Lesion present.   Neurological:      General: No focal deficit present.      Mental Status: He is alert.   Psychiatric:         Mood and Affect: Mood normal.         Behavior: Behavior normal.         Thought Content: Thought content normal.         Judgment: Judgment normal.       1. Pressure injury of left hip, stage 3    2. Pressure injury of sacral region, stage 3    3. Pressure ulcer of dorsum of left foot, stage 2    4. Pressure injury of dorsum of left foot, unstageable        Plan:     Problem List Items Addressed This Visit          Orthopedic    Pressure injury of left hip, stage 3 - Primary    Pressure injury of dorsum of left foot, unstageable    Pressure injury of sacral region, stage 3     No follow-ups on file.      I am having Juliet Arboleda maintain his atorvastatin, carbidopa-levodopa  mg, donepeziL, Lactobacillus acidophilus, memantine, polyethylene glycol, acetic acid, silver sulfADIAZINE 1%, SantyL, sulfamethoxazole-trimethoprim 200-40 mg/5 ml, ISOSOURCE 1.5 ADDISON, L. acidophilus/L. bifidus (LACTOBACILLUS ACIDOPH & BIFID ORAL), KALEB, ENSURE ACTIVE HIGH PROTEIN, non-adherent bandage, neomycin-polymyxin-dexamethasone, and flunisolide 25 mcg (0.025%).    Juliet was seen today for non-healing wound follow up.    Diagnoses and all orders for this visit:    Pressure injury of left hip, stage 3    Pressure injury of sacral region, stage 3    Pressure ulcer of dorsum of left foot, stage 2    Pressure injury of dorsum of left foot, unstageable         [unfilled]  No orders of  the defined types were placed in this encounter.
